# Patient Record
Sex: FEMALE | Race: WHITE | Employment: OTHER | URBAN - NONMETROPOLITAN AREA
[De-identification: names, ages, dates, MRNs, and addresses within clinical notes are randomized per-mention and may not be internally consistent; named-entity substitution may affect disease eponyms.]

---

## 2018-08-14 ENCOUNTER — OFFICE VISIT (OUTPATIENT)
Dept: CARDIOLOGY CLINIC | Age: 73
End: 2018-08-14
Payer: MEDICARE

## 2018-08-14 VITALS
BODY MASS INDEX: 31.21 KG/M2 | HEART RATE: 80 BPM | HEIGHT: 70 IN | SYSTOLIC BLOOD PRESSURE: 114 MMHG | WEIGHT: 218 LBS | DIASTOLIC BLOOD PRESSURE: 70 MMHG

## 2018-08-14 DIAGNOSIS — R07.9 CHEST PAIN, UNSPECIFIED TYPE: ICD-10-CM

## 2018-08-14 DIAGNOSIS — R06.00 DYSPNEA, UNSPECIFIED TYPE: Primary | ICD-10-CM

## 2018-08-14 PROCEDURE — 99203 OFFICE O/P NEW LOW 30 MIN: CPT | Performed by: INTERNAL MEDICINE

## 2018-08-14 RX ORDER — LANOLIN ALCOHOL/MO/W.PET/CERES
3 CREAM (GRAM) TOPICAL NIGHTLY PRN
COMMUNITY

## 2018-08-14 RX ORDER — CLINDAMYCIN HYDROCHLORIDE 300 MG/1
300 CAPSULE ORAL DAILY
COMMUNITY
End: 2022-01-01 | Stop reason: ALTCHOICE

## 2018-08-14 NOTE — PROGRESS NOTES
Today's Date: 8/14/2018  Patient Name: Lindsay Stiles  Patient's age: 74 y. o., 1945          The patient is a 74 y.o.  female is in the office for evaluation of abnormal ECG. She was admitted in May for possible hypoventilation and told ED she dad progressive MEYER and occasional chest tightness. She had chronic B LE edema for years. Denies prior cardiac hx. She had a possitive sleep study recently. Past Medical History:   has a past medical history of Cancer ([facility] Diabetes mellitus ([facility] GERD (gastroesophageal reflux disease); Hyperlipidemia; and Hypertension. Past Surgical History:   has a past surgical history that includes Cholecystectomy; Appendectomy; and Colon surgery. Home Medications:    Prior to Admission medications    Not on File       Allergies:  Patient has no known allergies. Social History:        REVIEW OF SYSTEMS:  CONSTITUTIONAL:NEGATIVE  HEENT:NEG  Cardiovascular: Yes chest pain, Yes dyspnea on exertion, No palpitations. Lower extremity edema: No  RESPIRATORY: MEYER  GASTROINTESTINAL:  negative  GENITOURINARY:  negative  INTEGUMENT:  negative  MUSCULOSKELETAL:  positive for  pain  NEUROLOGICAL:  negative    PHYSICAL EXAM:      /70 (Site: Left Arm, Position: Sitting, Cuff Size: Large Adult)   Pulse 80   Ht 5' 10\" (1.778 m)   Wt 218 lb (98.9 kg)   BMI 31.28 kg/m²    HEENT: PERRL, no cervical lymphadenopathy. No masses palpable. Cardiovascular:  · The apical impulse is not displaced  · Heart  Sounds:RRR, S4  · Jugular venous pulsation Normal  · The carotid upstroke is NL  · Peripheral pulses are symmetrical and full  Respiratory: Good respiratory effort. On auscultation: clear to auscultation bilaterally  Abdomen:  · No masses or tenderness  · Bowel sounds present  Extremities:  ·  No Cyanosis or Clubbing  ·  Lower extremity edema: Yes  Skin: Warm and dry    Cardiac data:    EKG: SR, NS T ABN.     Labs:     CBC: No results for input(s): WBC,

## 2018-08-14 NOTE — PATIENT INSTRUCTIONS
Nuclear Stress Test      Please report to [facility]. Use [facility]Blast Entrance. Please allow four hours to complete this test.  There will be frequent waiting periods, so feel free to bring something to do in the down times. You will have an IV started for this test.     --PLEASE GIVE 24 HOURS NOTICE TO CANCEL/RESCHEDULE--     >>  Nothing to eat or drink for FOUR (4) HOURS prior to arrival time. >>  No caffeine for 24 hours prior to test. This includes decaffeinated beverages,  chocolate, tea and cola drinks. >>  If you are diabetic, check with your Primary Care Provider regarding changes in your insulin or diabetic pills on test day.    >>  No smoking for 12 hours before the test.     >>  Please bring your prescription medications with you on Stress Day. ~  Take regular medications with sips of water.       ~  Hold the following medications prior to day of stress test:     _______________________________________________       ~If you wear a Nitroglycerin patch, please hold for 48 hours. >>  The nuclear camera comes close to your body during pictures; however, you are not enclosed. >>  If you are walking on the treadmill, wear comfortable clothes and shoes. Please do not apply lotion or oils to skin on the day of stress test.     +++ Pregnancy +++   If you are a woman of child bearing age, you will need to complete a blood test prior to your stress test. Please notify the nurse if you think you might be pregnant.

## 2018-08-21 ENCOUNTER — TELEPHONE (OUTPATIENT)
Dept: CARDIOLOGY | Age: 73
End: 2018-08-21

## 2018-08-21 DIAGNOSIS — R07.9 CHEST PAIN, UNSPECIFIED TYPE: ICD-10-CM

## 2018-08-21 DIAGNOSIS — R06.00 DYSPNEA, UNSPECIFIED TYPE: ICD-10-CM

## 2018-08-21 LAB
LEFT VENTRICULAR EJECTION FRACTION HIGH VALUE: NORMAL %
LEFT VENTRICULAR EJECTION FRACTION MODE: NORMAL
LV EF: NORMAL %

## 2018-09-04 ENCOUNTER — TELEPHONE (OUTPATIENT)
Dept: CARDIOLOGY CLINIC | Age: 73
End: 2018-09-04

## 2018-09-04 NOTE — LETTER
[address] [place] [address] Drive  [address] [place] [address]  Phone: [phone]    Courtney Mitchell MD        September 11, 2018    [address]      Dear Miah Ferguson: We have been unable to reach you by phone. We have your echocardiogam results. Please call us at [phone] to schedule a follow up appointment with cardiology. If you have any questions or concerns, please don't hesitate to call.     Sincerely,        Courtney Mitchell MD/ blessing

## 2021-01-01 ENCOUNTER — OUTSIDE SERVICES (OUTPATIENT)
Dept: INTERNAL MEDICINE | Age: 76
End: 2021-01-01
Payer: MEDICARE

## 2021-01-01 DIAGNOSIS — L03.116 CELLULITIS OF LEFT LEG: ICD-10-CM

## 2021-01-01 DIAGNOSIS — I10 HYPERTENSION, UNSPECIFIED TYPE: ICD-10-CM

## 2021-01-01 DIAGNOSIS — G47.33 OSA (OBSTRUCTIVE SLEEP APNEA): ICD-10-CM

## 2021-01-01 DIAGNOSIS — E11.9 TYPE 2 DIABETES MELLITUS WITHOUT COMPLICATION, UNSPECIFIED WHETHER LONG TERM INSULIN USE (HCC): Primary | ICD-10-CM

## 2021-01-01 DIAGNOSIS — N39.0 URINARY TRACT INFECTION WITHOUT HEMATURIA, SITE UNSPECIFIED: ICD-10-CM

## 2021-01-01 PROCEDURE — 99305 1ST NF CARE MODERATE MDM 35: CPT | Performed by: INTERNAL MEDICINE

## 2021-12-31 PROBLEM — E11.9 TYPE 2 DIABETES MELLITUS WITHOUT COMPLICATION (HCC): Status: ACTIVE | Noted: 2021-01-01

## 2021-12-31 PROBLEM — G47.33 OSA (OBSTRUCTIVE SLEEP APNEA): Status: ACTIVE | Noted: 2021-01-01

## 2021-12-31 PROBLEM — I10 HYPERTENSION: Status: ACTIVE | Noted: 2021-01-01

## 2021-12-31 NOTE — PROGRESS NOTES
Date of Admission: 12/17/2021  Date of Encounter: 12/22/2021  Patient:  Jamey Stockton  YOB: 1945      Chief Complaint: Admission after treatment in the hospital for UTI and cellulitis    History of Present Illness:  Patient is a 17-year-old with a past medical history of hypertension, diabetes, AMMON, was admitted to [facility] after treatment in the hospital for UTI as well as cellulitis in the left lower extremity. She seems to be doing better. She improved after antibiotics. Her is less painful now, denies any fever or chills. She denies also having any dysuria, hematuria, abdominal pain, flank pain at this time. Past Medical History: Hypertension, AMMON, type II diabetes  Past Family History: Hypertension  Social History: Denies smoking at this time    Review of Systems:  Constitutional: Denies fever, chills  Cardiac: Denies chest pain, palpitations  Except as dictated above, all other systems reviewed and are negative     Physical Exam  Vitals: Blood pressure 121/66. Pulse 64. Respirations 18. Temperature 98.5  Constitutional: No acute distress  Eyes: No lid lag or proptosis. PERRLA  HENT: External ears normal. No lesions on the lips  Respiratory: Good respiratory effort. No wheezing or crackles  Cardiovascular: Normal S1-S2. No lower extremity edema  Gastrointestinal: No visible veins or masses. Good bowel sounds  Skin: No abnormal rashes. No digital cyanosis  Neurologic: Cranial nerves grossly intact. Sensation grossly intact  Psychiatric: Alert and oriented. Normal Affect. Assessments  UTI  Cellulitis of the left lower extremity  Hypertension  AMMON  Type 2 diabetes    Plan  We will continue current medications. I will be notified of any changes to the patient's condition.   Cellulitis appears to be better, will continue antibiotics  We will continue to monitor blood pressure and blood sugar and adjust medications as necessary

## 2022-01-01 ENCOUNTER — HOSPITAL ENCOUNTER (INPATIENT)
Age: 77
LOS: 6 days | Discharge: OTHER INSTITUTION | DRG: 603 | End: 2022-07-14
Attending: EMERGENCY MEDICINE | Admitting: INTERNAL MEDICINE
Payer: MEDICARE

## 2022-01-01 ENCOUNTER — APPOINTMENT (OUTPATIENT)
Dept: CT IMAGING | Age: 77
DRG: 603 | End: 2022-01-01
Payer: MEDICARE

## 2022-01-01 ENCOUNTER — HOSPITAL ENCOUNTER (INPATIENT)
Age: 77
LOS: 1 days | DRG: 871 | End: 2022-09-30
Attending: EMERGENCY MEDICINE | Admitting: INTERNAL MEDICINE
Payer: MEDICARE

## 2022-01-01 ENCOUNTER — NURSE TRIAGE (OUTPATIENT)
Dept: OTHER | Facility: CLINIC | Age: 77
End: 2022-01-01

## 2022-01-01 ENCOUNTER — APPOINTMENT (OUTPATIENT)
Dept: GENERAL RADIOLOGY | Age: 77
DRG: 871 | End: 2022-01-01
Payer: MEDICARE

## 2022-01-01 ENCOUNTER — TELEPHONE (OUTPATIENT)
Dept: FAMILY MEDICINE CLINIC | Age: 77
End: 2022-01-01

## 2022-01-01 ENCOUNTER — APPOINTMENT (OUTPATIENT)
Dept: ULTRASOUND IMAGING | Age: 77
DRG: 871 | End: 2022-01-01
Payer: MEDICARE

## 2022-01-01 ENCOUNTER — OFFICE VISIT (OUTPATIENT)
Dept: FAMILY MEDICINE CLINIC | Age: 77
DRG: 603 | End: 2022-01-01
Payer: MEDICARE

## 2022-01-01 ENCOUNTER — APPOINTMENT (OUTPATIENT)
Dept: INTERVENTIONAL RADIOLOGY/VASCULAR | Age: 77
DRG: 603 | End: 2022-01-01
Payer: MEDICARE

## 2022-01-01 VITALS
SYSTOLIC BLOOD PRESSURE: 138 MMHG | DIASTOLIC BLOOD PRESSURE: 60 MMHG | OXYGEN SATURATION: 96 % | WEIGHT: 195 LBS | TEMPERATURE: 97.1 F | HEIGHT: 63 IN | BODY MASS INDEX: 34.55 KG/M2 | HEART RATE: 82 BPM | RESPIRATION RATE: 16 BRPM

## 2022-01-01 VITALS
HEART RATE: 58 BPM | OXYGEN SATURATION: 99 % | RESPIRATION RATE: 17 BRPM | BODY MASS INDEX: 27.93 KG/M2 | DIASTOLIC BLOOD PRESSURE: 60 MMHG | WEIGHT: 195.1 LBS | HEIGHT: 70 IN | TEMPERATURE: 96.6 F | SYSTOLIC BLOOD PRESSURE: 125 MMHG

## 2022-01-01 VITALS
RESPIRATION RATE: 30 BRPM | DIASTOLIC BLOOD PRESSURE: 13 MMHG | WEIGHT: 172.4 LBS | BODY MASS INDEX: 24.68 KG/M2 | HEIGHT: 70 IN | TEMPERATURE: 96.3 F | OXYGEN SATURATION: 94 % | SYSTOLIC BLOOD PRESSURE: 80 MMHG

## 2022-01-01 DIAGNOSIS — N63.20 LEFT BREAST MASS: ICD-10-CM

## 2022-01-01 DIAGNOSIS — R60.0 LOCALIZED EDEMA: ICD-10-CM

## 2022-01-01 DIAGNOSIS — L03.119 CELLULITIS OF LOWER EXTREMITY, UNSPECIFIED LATERALITY: Primary | ICD-10-CM

## 2022-01-01 DIAGNOSIS — L89.90 PRESSURE INJURY OF SKIN, UNSPECIFIED INJURY STAGE, UNSPECIFIED LOCATION: ICD-10-CM

## 2022-01-01 DIAGNOSIS — T68.XXXA HYPOTHERMIA, INITIAL ENCOUNTER: ICD-10-CM

## 2022-01-01 DIAGNOSIS — I63.9 CEREBROVASCULAR ACCIDENT (CVA), UNSPECIFIED MECHANISM (HCC): ICD-10-CM

## 2022-01-01 DIAGNOSIS — N63.20 BREAST MASS, LEFT: Primary | ICD-10-CM

## 2022-01-01 DIAGNOSIS — R30.0 DYSURIA: ICD-10-CM

## 2022-01-01 DIAGNOSIS — E11.9 TYPE 2 DIABETES MELLITUS WITHOUT COMPLICATION, UNSPECIFIED WHETHER LONG TERM INSULIN USE (HCC): Primary | ICD-10-CM

## 2022-01-01 DIAGNOSIS — A41.9 SEPTICEMIA (HCC): Primary | ICD-10-CM

## 2022-01-01 DIAGNOSIS — R60.0 BILATERAL LEG EDEMA: ICD-10-CM

## 2022-01-01 DIAGNOSIS — N63.20 BREAST MASS, LEFT: ICD-10-CM

## 2022-01-01 LAB
-: ABNORMAL
ABSOLUTE EOS #: 0 K/UL (ref 0–0.4)
ABSOLUTE EOS #: 0 K/UL (ref 0–0.4)
ABSOLUTE EOS #: 0.58 K/UL (ref 0–0.44)
ABSOLUTE EOS #: 0.58 K/UL (ref 0–0.44)
ABSOLUTE EOS #: 0.62 K/UL (ref 0–0.44)
ABSOLUTE EOS #: 0.63 K/UL (ref 0–0.44)
ABSOLUTE EOS #: 0.66 K/UL (ref 0–0.44)
ABSOLUTE EOS #: 0.7 K/UL (ref 0–0.44)
ABSOLUTE EOS #: 0.7 K/UL (ref 0–0.44)
ABSOLUTE IMMATURE GRANULOCYTE: 0.03 K/UL (ref 0–0.3)
ABSOLUTE IMMATURE GRANULOCYTE: 0.13 K/UL (ref 0–0.3)
ABSOLUTE IMMATURE GRANULOCYTE: 0.55 K/UL (ref 0–0.3)
ABSOLUTE IMMATURE GRANULOCYTE: <0.03 K/UL (ref 0–0.3)
ABSOLUTE LYMPH #: 1.01 K/UL (ref 1–4.8)
ABSOLUTE LYMPH #: 1.92 K/UL (ref 1–4.8)
ABSOLUTE LYMPH #: 2.73 K/UL (ref 1.1–3.7)
ABSOLUTE LYMPH #: 2.74 K/UL (ref 1.1–3.7)
ABSOLUTE LYMPH #: 2.75 K/UL (ref 1.1–3.7)
ABSOLUTE LYMPH #: 2.85 K/UL (ref 1.1–3.7)
ABSOLUTE LYMPH #: 2.93 K/UL (ref 1.1–3.7)
ABSOLUTE LYMPH #: 3.01 K/UL (ref 1.1–3.7)
ABSOLUTE LYMPH #: 3.24 K/UL (ref 1.1–3.7)
ABSOLUTE MONO #: 0.27 K/UL (ref 0.1–0.8)
ABSOLUTE MONO #: 0.5 K/UL (ref 0.1–0.8)
ABSOLUTE MONO #: 0.62 K/UL (ref 0.1–1.2)
ABSOLUTE MONO #: 0.65 K/UL (ref 0.1–1.2)
ABSOLUTE MONO #: 0.68 K/UL (ref 0.1–1.2)
ABSOLUTE MONO #: 0.69 K/UL (ref 0.1–1.2)
ABSOLUTE MONO #: 0.7 K/UL (ref 0.1–1.2)
ABSOLUTE MONO #: 0.71 K/UL (ref 0.1–1.2)
ABSOLUTE MONO #: 0.78 K/UL (ref 0.1–1.2)
ACTION: NORMAL
ACTION: NORMAL
ALBUMIN SERPL-MCNC: 1.9 G/DL (ref 3.5–5.2)
ALBUMIN SERPL-MCNC: 4 G/DL (ref 3.5–5.2)
ALBUMIN/GLOBULIN RATIO: 0.6 (ref 1–2.5)
ALBUMIN/GLOBULIN RATIO: 1.2 (ref 1–2.5)
ALLEN TEST: ABNORMAL
ALP BLD-CCNC: 97 U/L (ref 35–104)
ALP BLD-CCNC: 99 U/L (ref 35–104)
ALT SERPL-CCNC: 15 U/L (ref 5–33)
ALT SERPL-CCNC: 69 U/L (ref 5–33)
ANION GAP SERPL CALCULATED.3IONS-SCNC: 10 MMOL/L (ref 9–17)
ANION GAP SERPL CALCULATED.3IONS-SCNC: 12 MMOL/L (ref 9–17)
ANION GAP SERPL CALCULATED.3IONS-SCNC: 14 MMOL/L (ref 9–17)
ANION GAP SERPL CALCULATED.3IONS-SCNC: 15 MMOL/L (ref 9–17)
ANION GAP SERPL CALCULATED.3IONS-SCNC: 16 MMOL/L (ref 9–17)
ANION GAP SERPL CALCULATED.3IONS-SCNC: 25 MMOL/L (ref 9–17)
ANION GAP SERPL CALCULATED.3IONS-SCNC: 7 MMOL/L (ref 9–17)
ANION GAP SERPL CALCULATED.3IONS-SCNC: 8 MMOL/L (ref 9–17)
ANION GAP SERPL CALCULATED.3IONS-SCNC: 9 MMOL/L (ref 9–17)
ANION GAP SERPL CALCULATED.3IONS-SCNC: 9 MMOL/L (ref 9–17)
ANION GAP SERPL CALCULATED.3IONS-SCNC: ABNORMAL MMOL/L (ref 9–17)
AST SERPL-CCNC: 16 U/L
AST SERPL-CCNC: 48 U/L
BACTERIA: ABNORMAL
BACTERIA: ABNORMAL
BASOPHILS # BLD: 0 % (ref 0–2)
BASOPHILS # BLD: 0 % (ref 0–2)
BASOPHILS # BLD: 1 % (ref 0–2)
BASOPHILS ABSOLUTE: 0 K/UL (ref 0–0.2)
BASOPHILS ABSOLUTE: 0 K/UL (ref 0–0.2)
BASOPHILS ABSOLUTE: 0.05 K/UL (ref 0–0.2)
BASOPHILS ABSOLUTE: 0.06 K/UL (ref 0–0.2)
BASOPHILS ABSOLUTE: 0.06 K/UL (ref 0–0.2)
BASOPHILS ABSOLUTE: 0.07 K/UL (ref 0–0.2)
BASOPHILS ABSOLUTE: 0.07 K/UL (ref 0–0.2)
BASOPHILS ABSOLUTE: 0.08 K/UL (ref 0–0.2)
BASOPHILS ABSOLUTE: 0.09 K/UL (ref 0–0.2)
BILIRUB SERPL-MCNC: 0.33 MG/DL (ref 0.3–1.2)
BILIRUB SERPL-MCNC: 1.2 MG/DL (ref 0.3–1.2)
BILIRUBIN URINE: ABNORMAL
BILIRUBIN URINE: NEGATIVE
BUN BLDV-MCNC: 17 MG/DL (ref 8–23)
BUN BLDV-MCNC: 18 MG/DL (ref 8–23)
BUN BLDV-MCNC: 20 MG/DL (ref 8–23)
BUN BLDV-MCNC: 22 MG/DL (ref 8–23)
BUN BLDV-MCNC: 26 MG/DL (ref 8–23)
BUN BLDV-MCNC: 26 MG/DL (ref 8–23)
BUN BLDV-MCNC: 32 MG/DL (ref 8–23)
BUN BLDV-MCNC: 61 MG/DL (ref 8–23)
BUN BLDV-MCNC: 75 MG/DL (ref 8–23)
BUN BLDV-MCNC: 83 MG/DL (ref 8–23)
BUN BLDV-MCNC: 85 MG/DL (ref 8–23)
BUN BLDV-MCNC: 85 MG/DL (ref 8–23)
BUN BLDV-MCNC: 93 MG/DL (ref 8–23)
BUN/CREAT BLD: 18 (ref 9–20)
BUN/CREAT BLD: 18 (ref 9–20)
BUN/CREAT BLD: 21 (ref 9–20)
BUN/CREAT BLD: 21 (ref 9–20)
BUN/CREAT BLD: 23 (ref 9–20)
BUN/CREAT BLD: 29 (ref 9–20)
BUN/CREAT BLD: 30 (ref 9–20)
C-REACTIVE PROTEIN: 346.2 MG/L (ref 0–5)
CALCIUM IONIZED: 1.48 MMOL/L (ref 1.13–1.33)
CALCIUM SERPL-MCNC: 10.1 MG/DL (ref 8.6–10.4)
CALCIUM SERPL-MCNC: 10.3 MG/DL (ref 8.6–10.4)
CALCIUM SERPL-MCNC: 10.4 MG/DL (ref 8.6–10.4)
CALCIUM SERPL-MCNC: 10.9 MG/DL (ref 8.6–10.4)
CALCIUM SERPL-MCNC: 11.4 MG/DL (ref 8.6–10.4)
CALCIUM SERPL-MCNC: 5.9 MG/DL (ref 8.6–10.4)
CALCIUM SERPL-MCNC: 7.9 MG/DL (ref 8.6–10.4)
CALCIUM SERPL-MCNC: 9.1 MG/DL (ref 8.6–10.4)
CALCIUM SERPL-MCNC: 9.3 MG/DL (ref 8.6–10.4)
CALCIUM SERPL-MCNC: 9.7 MG/DL (ref 8.6–10.4)
CALCIUM SERPL-MCNC: 9.7 MG/DL (ref 8.6–10.4)
CARBOXYHEMOGLOBIN: 1.4 % (ref 0–5)
CASTS UA: ABNORMAL /LPF (ref 0–2)
CASTS UA: ABNORMAL /LPF (ref 0–2)
CHLORIDE BLD-SCNC: 103 MMOL/L (ref 98–107)
CHLORIDE BLD-SCNC: 103 MMOL/L (ref 98–107)
CHLORIDE BLD-SCNC: 105 MMOL/L (ref 98–107)
CHLORIDE BLD-SCNC: 105 MMOL/L (ref 98–107)
CHLORIDE BLD-SCNC: 106 MMOL/L (ref 98–107)
CHLORIDE BLD-SCNC: 107 MMOL/L (ref 98–107)
CHLORIDE BLD-SCNC: 107 MMOL/L (ref 98–107)
CHLORIDE BLD-SCNC: 122 MMOL/L (ref 98–107)
CHLORIDE BLD-SCNC: 123 MMOL/L (ref 98–107)
CHLORIDE BLD-SCNC: 123 MMOL/L (ref 98–107)
CHLORIDE BLD-SCNC: 124 MMOL/L (ref 98–107)
CHLORIDE BLD-SCNC: 124 MMOL/L (ref 98–107)
CHLORIDE BLD-SCNC: 131 MMOL/L (ref 98–107)
CHOLESTEROL/HDL RATIO: 4.6
CHOLESTEROL: 187 MG/DL
CHP ED QC CHECK: ABNORMAL
CO2: 11 MMOL/L (ref 20–31)
CO2: 12 MMOL/L (ref 20–31)
CO2: 16 MMOL/L (ref 20–31)
CO2: 18 MMOL/L (ref 20–31)
CO2: 21 MMOL/L (ref 20–31)
CO2: 23 MMOL/L (ref 20–31)
CO2: 23 MMOL/L (ref 20–31)
CO2: 24 MMOL/L (ref 20–31)
CO2: 25 MMOL/L (ref 20–31)
CO2: 26 MMOL/L (ref 20–31)
CO2: 27 MMOL/L (ref 20–31)
CO2: 29 MMOL/L (ref 20–31)
CO2: <6 MMOL/L (ref 20–31)
COLOR: ABNORMAL
COMPLEMENT C3: 64 MG/DL (ref 90–180)
COMPLEMENT C4: 13 MG/DL (ref 10–40)
CREAT SERPL-MCNC: 0.9 MG/DL (ref 0.5–0.9)
CREAT SERPL-MCNC: 0.94 MG/DL (ref 0.5–0.9)
CREAT SERPL-MCNC: 0.95 MG/DL (ref 0.5–0.9)
CREAT SERPL-MCNC: 0.95 MG/DL (ref 0.5–0.9)
CREAT SERPL-MCNC: 1.02 MG/DL (ref 0.5–0.9)
CREAT SERPL-MCNC: 1.04 MG/DL (ref 0.5–0.9)
CREAT SERPL-MCNC: 1.08 MG/DL (ref 0.5–0.9)
CREAT SERPL-MCNC: 1.14 MG/DL (ref 0.5–0.9)
CREAT SERPL-MCNC: 1.41 MG/DL (ref 0.5–0.9)
CREAT SERPL-MCNC: 1.83 MG/DL (ref 0.5–0.9)
CREAT SERPL-MCNC: 1.91 MG/DL (ref 0.5–0.9)
CREAT SERPL-MCNC: 2.15 MG/DL (ref 0.5–0.9)
CREAT SERPL-MCNC: 2.19 MG/DL (ref 0.5–0.9)
CREATININE URINE: 74.4 MG/DL (ref 28–217)
CRYSTALS, UA: ABNORMAL /HPF
CRYSTALS, UA: ABNORMAL /HPF
CULTURE: NO GROWTH
CULTURE: NORMAL
CULTURE: NORMAL
DATE AND TIME: NORMAL
DATE AND TIME: NORMAL
EKG ATRIAL RATE: 357 BPM
EKG ATRIAL RATE: 76 BPM
EKG P AXIS: 51 DEGREES
EKG P-R INTERVAL: 178 MS
EKG Q-T INTERVAL: 378 MS
EKG Q-T INTERVAL: 450 MS
EKG QRS DURATION: 98 MS
EKG QRS DURATION: 98 MS
EKG QTC CALCULATION (BAZETT): 425 MS
EKG QTC CALCULATION (BAZETT): 499 MS
EKG R AXIS: -45 DEGREES
EKG R AXIS: -49 DEGREES
EKG T AXIS: 123 DEGREES
EKG T AXIS: 51 DEGREES
EKG VENTRICULAR RATE: 74 BPM
EKG VENTRICULAR RATE: 76 BPM
EOSINOPHIL,URINE: NORMAL
EOSINOPHILS RELATIVE PERCENT: 0 % (ref 1–4)
EOSINOPHILS RELATIVE PERCENT: 0 % (ref 1–4)
EOSINOPHILS RELATIVE PERCENT: 7 % (ref 1–4)
EOSINOPHILS RELATIVE PERCENT: 7 % (ref 1–4)
EOSINOPHILS RELATIVE PERCENT: 8 % (ref 1–4)
EOSINOPHILS RELATIVE PERCENT: 9 % (ref 1–4)
EPITHELIAL CELLS UA: ABNORMAL /HPF (ref 0–5)
EPITHELIAL CELLS UA: ABNORMAL /HPF (ref 0–5)
ESTIMATED AVERAGE GLUCOSE: 128 MG/DL
FIO2: 100
FIO2: ABNORMAL
FREE KAPPA/LAMBDA RATIO: 3.01 (ref 0.26–1.65)
GFR AFRICAN AMERICAN: 26 ML/MIN
GFR AFRICAN AMERICAN: 27 ML/MIN
GFR AFRICAN AMERICAN: 31 ML/MIN
GFR AFRICAN AMERICAN: 32 ML/MIN
GFR AFRICAN AMERICAN: 44 ML/MIN
GFR AFRICAN AMERICAN: 56 ML/MIN
GFR AFRICAN AMERICAN: 59 ML/MIN
GFR AFRICAN AMERICAN: >60 ML/MIN
GFR NON-AFRICAN AMERICAN: 22 ML/MIN
GFR NON-AFRICAN AMERICAN: 22 ML/MIN
GFR NON-AFRICAN AMERICAN: 25 ML/MIN
GFR NON-AFRICAN AMERICAN: 27 ML/MIN
GFR NON-AFRICAN AMERICAN: 36 ML/MIN
GFR NON-AFRICAN AMERICAN: 46 ML/MIN
GFR NON-AFRICAN AMERICAN: 49 ML/MIN
GFR NON-AFRICAN AMERICAN: 51 ML/MIN
GFR NON-AFRICAN AMERICAN: 52 ML/MIN
GFR NON-AFRICAN AMERICAN: 57 ML/MIN
GFR NON-AFRICAN AMERICAN: >60 ML/MIN
GFR SERPL CREATININE-BSD FRML MDRD: ABNORMAL ML/MIN/{1.73_M2}
GLUCOSE BLD-MCNC: 100 MG/DL (ref 65–105)
GLUCOSE BLD-MCNC: 100 MG/DL (ref 74–100)
GLUCOSE BLD-MCNC: 103 MG/DL (ref 70–99)
GLUCOSE BLD-MCNC: 103 MG/DL (ref 70–99)
GLUCOSE BLD-MCNC: 104 MG/DL (ref 70–99)
GLUCOSE BLD-MCNC: 108 MG/DL (ref 70–99)
GLUCOSE BLD-MCNC: 111 MG/DL (ref 70–99)
GLUCOSE BLD-MCNC: 113 MG/DL (ref 70–99)
GLUCOSE BLD-MCNC: 114 MG/DL (ref 70–99)
GLUCOSE BLD-MCNC: 128 MG/DL (ref 70–99)
GLUCOSE BLD-MCNC: 145 MG/DL
GLUCOSE BLD-MCNC: 189 MG/DL (ref 65–105)
GLUCOSE BLD-MCNC: 62 MG/DL (ref 74–100)
GLUCOSE BLD-MCNC: 64 MG/DL (ref 65–105)
GLUCOSE BLD-MCNC: 69 MG/DL (ref 65–105)
GLUCOSE BLD-MCNC: 74 MG/DL (ref 65–105)
GLUCOSE BLD-MCNC: 85 MG/DL (ref 65–105)
GLUCOSE BLD-MCNC: 87 MG/DL (ref 70–99)
GLUCOSE BLD-MCNC: 87 MG/DL (ref 70–99)
GLUCOSE BLD-MCNC: 88 MG/DL (ref 65–105)
GLUCOSE BLD-MCNC: 89 MG/DL (ref 70–99)
GLUCOSE BLD-MCNC: 95 MG/DL (ref 70–99)
GLUCOSE BLD-MCNC: 96 MG/DL (ref 70–99)
GLUCOSE BLD-MCNC: 98 MG/DL (ref 74–100)
GLUCOSE URINE: NEGATIVE
GLUCOSE URINE: NEGATIVE
HBA1C MFR BLD: 6.1 % (ref 4–6)
HCO3 VENOUS: 24 MMOL/L (ref 24–30)
HCT VFR BLD CALC: 41 % (ref 36.3–47.1)
HCT VFR BLD CALC: 42.7 % (ref 36.3–47.1)
HCT VFR BLD CALC: 44.1 % (ref 36.3–47.1)
HCT VFR BLD CALC: 44.8 % (ref 36.3–47.1)
HCT VFR BLD CALC: 45 % (ref 36.3–47.1)
HCT VFR BLD CALC: 45.4 % (ref 36.3–47.1)
HCT VFR BLD CALC: 46.4 % (ref 36.3–47.1)
HCT VFR BLD CALC: 49.2 % (ref 36.3–47.1)
HCT VFR BLD CALC: 57 % (ref 36.3–47.1)
HDLC SERPL-MCNC: 41 MG/DL
HEMOGLOBIN: 13.8 G/DL (ref 11.9–15.1)
HEMOGLOBIN: 14.4 G/DL (ref 11.9–15.1)
HEMOGLOBIN: 14.5 G/DL (ref 11.9–15.1)
HEMOGLOBIN: 14.8 G/DL (ref 11.9–15.1)
HEMOGLOBIN: 14.9 G/DL (ref 11.9–15.1)
HEMOGLOBIN: 15 G/DL (ref 11.9–15.1)
HEMOGLOBIN: 15.7 G/DL (ref 11.9–15.1)
HEMOGLOBIN: 17.2 G/DL (ref 11.9–15.1)
HEMOGLOBIN: 18.2 G/DL (ref 11.9–15.1)
IMMATURE GRANULOCYTES: 0 %
IMMATURE GRANULOCYTES: 1 %
IMMATURE GRANULOCYTES: 4 %
KAPPA FREE LIGHT CHAINS QNT: 4.58 MG/DL (ref 0.37–1.94)
KETONES, URINE: NEGATIVE
KETONES, URINE: NEGATIVE
LACTIC ACID, SEPSIS WHOLE BLOOD: 2.6 MMOL/L (ref 0.5–1.9)
LACTIC ACID, SEPSIS WHOLE BLOOD: 3.1 MMOL/L (ref 0.5–1.9)
LACTIC ACID, SEPSIS: 0.8 MMOL/L (ref 0.5–1.9)
LACTIC ACID, WHOLE BLOOD: 16 MMOL/L (ref 0.7–2.1)
LAMBDA FREE LIGHT CHAINS QNT: 1.52 MG/DL (ref 0.57–2.63)
LDL CHOLESTEROL: 114 MG/DL (ref 0–130)
LEUKOCYTE ESTERASE, URINE: ABNORMAL
LEUKOCYTE ESTERASE, URINE: ABNORMAL
LYMPHOCYTES # BLD: 14 % (ref 24–44)
LYMPHOCYTES # BLD: 32 % (ref 24–43)
LYMPHOCYTES # BLD: 34 % (ref 24–43)
LYMPHOCYTES # BLD: 34 % (ref 24–43)
LYMPHOCYTES # BLD: 35 % (ref 24–43)
LYMPHOCYTES # BLD: 36 % (ref 24–43)
LYMPHOCYTES # BLD: 37 % (ref 24–43)
LYMPHOCYTES # BLD: 37 % (ref 24–43)
LYMPHOCYTES # BLD: 8 % (ref 24–44)
MAGNESIUM: 2.4 MG/DL (ref 1.6–2.6)
MAGNESIUM: 2.5 MG/DL (ref 1.6–2.6)
MAGNESIUM: 2.7 MG/DL (ref 1.6–2.6)
MCH RBC QN AUTO: 31.5 PG (ref 25.2–33.5)
MCH RBC QN AUTO: 31.9 PG (ref 25.2–33.5)
MCH RBC QN AUTO: 31.9 PG (ref 25.2–33.5)
MCH RBC QN AUTO: 32 PG (ref 25.2–33.5)
MCH RBC QN AUTO: 32.3 PG (ref 25.2–33.5)
MCH RBC QN AUTO: 32.4 PG (ref 25.2–33.5)
MCH RBC QN AUTO: 32.5 PG (ref 25.2–33.5)
MCH RBC QN AUTO: 32.6 PG (ref 25.2–33.5)
MCH RBC QN AUTO: 32.7 PG (ref 25.2–33.5)
MCHC RBC AUTO-ENTMCNC: 31.7 G/DL (ref 25.2–33.5)
MCHC RBC AUTO-ENTMCNC: 31.9 G/DL (ref 28.4–34.8)
MCHC RBC AUTO-ENTMCNC: 33.3 G/DL (ref 25.2–33.5)
MCHC RBC AUTO-ENTMCNC: 33.3 G/DL (ref 25.2–33.5)
MCHC RBC AUTO-ENTMCNC: 33.6 G/DL (ref 25.2–33.5)
MCHC RBC AUTO-ENTMCNC: 33.7 G/DL (ref 25.2–33.5)
MCHC RBC AUTO-ENTMCNC: 33.8 G/DL (ref 25.2–33.5)
MCHC RBC AUTO-ENTMCNC: 34 G/DL (ref 25.2–33.5)
MCHC RBC AUTO-ENTMCNC: 35 G/DL (ref 28.4–34.8)
MCV RBC AUTO: 102.9 FL (ref 82.6–102.9)
MCV RBC AUTO: 93.4 FL (ref 82.6–102.9)
MCV RBC AUTO: 94.7 FL (ref 82.6–102.9)
MCV RBC AUTO: 95.5 FL (ref 82.6–102.9)
MCV RBC AUTO: 95.7 FL (ref 82.6–102.9)
MCV RBC AUTO: 95.9 FL (ref 82.6–102.9)
MCV RBC AUTO: 95.9 FL (ref 82.6–102.9)
MCV RBC AUTO: 96.8 FL (ref 82.6–102.9)
MCV RBC AUTO: 98.8 FL (ref 82.6–102.9)
MODE: ABNORMAL
MODE: ABNORMAL
MONOCYTES # BLD: 2 % (ref 1–7)
MONOCYTES # BLD: 4 % (ref 1–7)
MONOCYTES # BLD: 7 % (ref 3–12)
MONOCYTES # BLD: 8 % (ref 3–12)
MONOCYTES # BLD: 9 % (ref 3–12)
MORPHOLOGY: ABNORMAL
MORPHOLOGY: NORMAL
NEGATIVE BASE EXCESS, ART: 10 (ref 0–2)
NEGATIVE BASE EXCESS, ART: 14 (ref 0–2)
NEGATIVE BASE EXCESS, ART: 14 (ref 0–2)
NEGATIVE BASE EXCESS, ART: 22 (ref 0–2)
NEGATIVE BASE EXCESS, VEN: 7.2 MMOL/L (ref 0–2)
NITRITE, URINE: NEGATIVE
NITRITE, URINE: NEGATIVE
NOTIFY: NORMAL
NOTIFY: NORMAL
NRBC AUTOMATED: 0 PER 100 WBC
NRBC AUTOMATED: 0.6 PER 100 WBC
O2 DEVICE/FLOW/%: ABNORMAL
O2 SAT, VEN: 76.9 % (ref 60–85)
PARTIAL THROMBOPLASTIN TIME: 25.7 SEC (ref 23.9–33.8)
PATIENT TEMP: 34.2
PATIENT TEMP: 35.1
PATIENT TEMP: 37
PCO2, VEN: 71.5 MM HG (ref 39–55)
PDW BLD-RTO: 11.9 % (ref 11.8–14.4)
PDW BLD-RTO: 12 % (ref 11.8–14.4)
PDW BLD-RTO: 12.2 % (ref 11.8–14.4)
PDW BLD-RTO: 13.8 % (ref 11.8–14.4)
PDW BLD-RTO: 14.3 % (ref 11.8–14.4)
PH UA: 5 (ref 5–8)
PH UA: 7 (ref 5–6)
PH VENOUS: 7.15 (ref 7.32–7.42)
PHOSPHORUS: 11.2 MG/DL (ref 2.6–4.5)
PLATELET # BLD: 209 K/UL (ref 138–453)
PLATELET # BLD: 210 K/UL (ref 138–453)
PLATELET # BLD: 213 K/UL (ref 138–453)
PLATELET # BLD: 215 K/UL (ref 138–453)
PLATELET # BLD: 216 K/UL (ref 138–453)
PLATELET # BLD: 249 K/UL (ref 138–453)
PLATELET # BLD: ABNORMAL K/UL (ref 138–453)
PLATELET, FLUORESCENCE: 196 K/UL (ref 138–453)
PLATELET, FLUORESCENCE: 255 K/UL (ref 138–453)
PLATELET, IMMATURE FRACTION: 2 % (ref 1.1–10.3)
PLATELET, IMMATURE FRACTION: 6.1 % (ref 1.1–10.3)
PMV BLD AUTO: 11 FL (ref 8.1–13.5)
PMV BLD AUTO: 9.2 FL (ref 8.1–13.5)
PMV BLD AUTO: 9.3 FL (ref 8.1–13.5)
PMV BLD AUTO: 9.5 FL (ref 8.1–13.5)
PO2, VEN: 58.1 MM HG (ref 30–50)
POC HCO3: 11.1 MMOL/L (ref 21–28)
POC HCO3: 13.2 MMOL/L (ref 21–28)
POC HCO3: 17.8 MMOL/L (ref 21–28)
POC HCO3: 22.2 MMOL/L (ref 21–28)
POC LACTIC ACID: 5.33 MMOL/L (ref 0.56–1.39)
POC O2 SATURATION: 96 % (ref 94–98)
POC O2 SATURATION: 96 % (ref 94–98)
POC O2 SATURATION: 98 % (ref 94–98)
POC O2 SATURATION: 98 % (ref 94–98)
POC PCO2 TEMP: 61 MM HG
POC PCO2 TEMP: 67 MM HG
POC PCO2: 36.2 MM HG (ref 35–48)
POC PCO2: 56.4 MM HG (ref 35–48)
POC PCO2: 65.7 MM HG (ref 35–48)
POC PCO2: 76.1 MM HG (ref 35–48)
POC PH TEMP: 7.07
POC PH TEMP: 7.11
POC PH: 6.9 (ref 7.35–7.45)
POC PH: 7.04 (ref 7.35–7.45)
POC PH: 7.07 (ref 7.35–7.45)
POC PH: 7.17 (ref 7.35–7.45)
POC PO2 TEMP: 133 MM HG
POC PO2 TEMP: 98 MM HG
POC PO2: 103.2 MM HG (ref 83–108)
POC PO2: 114.3 MM HG (ref 83–108)
POC PO2: 143.7 MM HG (ref 83–108)
POC PO2: 165.2 MM HG (ref 83–108)
POTASSIUM SERPL-SCNC: 1.9 MMOL/L (ref 3.7–5.3)
POTASSIUM SERPL-SCNC: 3 MMOL/L (ref 3.7–5.3)
POTASSIUM SERPL-SCNC: 3.9 MMOL/L (ref 3.7–5.3)
POTASSIUM SERPL-SCNC: 3.9 MMOL/L (ref 3.7–5.3)
POTASSIUM SERPL-SCNC: 4.2 MMOL/L (ref 3.7–5.3)
POTASSIUM SERPL-SCNC: 4.3 MMOL/L (ref 3.7–5.3)
POTASSIUM SERPL-SCNC: 4.8 MMOL/L (ref 3.7–5.3)
POTASSIUM SERPL-SCNC: 5.7 MMOL/L (ref 3.7–5.3)
POTASSIUM SERPL-SCNC: 6.1 MMOL/L (ref 3.7–5.3)
POTASSIUM SERPL-SCNC: 6.2 MMOL/L (ref 3.7–5.3)
POTASSIUM SERPL-SCNC: 6.8 MMOL/L (ref 3.7–5.3)
PRO-BNP: 401 PG/ML
PROTEIN UA: ABNORMAL
PROTEIN UA: NEGATIVE
RBC # BLD: 4.33 M/UL (ref 3.95–5.11)
RBC # BLD: 4.41 M/UL (ref 3.95–5.11)
RBC # BLD: 4.46 M/UL (ref 3.95–5.11)
RBC # BLD: 4.62 M/UL (ref 3.95–5.11)
RBC # BLD: 4.65 M/UL (ref 3.95–5.11)
RBC # BLD: 4.67 M/UL (ref 3.95–5.11)
RBC # BLD: 4.84 M/UL (ref 3.95–5.11)
RBC # BLD: 5.27 M/UL (ref 3.95–5.11)
RBC # BLD: 5.77 M/UL (ref 3.95–5.11)
RBC UA: ABNORMAL /HPF (ref 0–2)
RBC UA: ABNORMAL /HPF (ref 0–4)
READ BACK: YES
READ BACK: YES
REASON FOR REJECTION: NORMAL
SAMPLE SITE: ABNORMAL
SARS-COV-2, RAPID: NOT DETECTED
SEG NEUTROPHILS: 44 % (ref 36–65)
SEG NEUTROPHILS: 45 % (ref 36–65)
SEG NEUTROPHILS: 47 % (ref 36–65)
SEG NEUTROPHILS: 49 % (ref 36–65)
SEG NEUTROPHILS: 49 % (ref 36–65)
SEG NEUTROPHILS: 50 % (ref 36–65)
SEG NEUTROPHILS: 51 % (ref 36–65)
SEG NEUTROPHILS: 80 % (ref 36–66)
SEG NEUTROPHILS: 87 % (ref 36–66)
SEGMENTED NEUTROPHILS ABSOLUTE COUNT: 10.96 K/UL (ref 1.8–7.7)
SEGMENTED NEUTROPHILS ABSOLUTE COUNT: 10.96 K/UL (ref 1.8–7.7)
SEGMENTED NEUTROPHILS ABSOLUTE COUNT: 3.43 K/UL (ref 1.5–8.1)
SEGMENTED NEUTROPHILS ABSOLUTE COUNT: 3.54 K/UL (ref 1.5–8.1)
SEGMENTED NEUTROPHILS ABSOLUTE COUNT: 3.6 K/UL (ref 1.5–8.1)
SEGMENTED NEUTROPHILS ABSOLUTE COUNT: 3.87 K/UL (ref 1.5–8.1)
SEGMENTED NEUTROPHILS ABSOLUTE COUNT: 4.27 K/UL (ref 1.5–8.1)
SEGMENTED NEUTROPHILS ABSOLUTE COUNT: 4.54 K/UL (ref 1.5–8.1)
SEGMENTED NEUTROPHILS ABSOLUTE COUNT: 4.58 K/UL (ref 1.5–8.1)
SODIUM BLD-SCNC: 136 MMOL/L (ref 135–144)
SODIUM BLD-SCNC: 138 MMOL/L (ref 135–144)
SODIUM BLD-SCNC: 140 MMOL/L (ref 135–144)
SODIUM BLD-SCNC: 141 MMOL/L (ref 135–144)
SODIUM BLD-SCNC: 141 MMOL/L (ref 135–144)
SODIUM BLD-SCNC: 154 MMOL/L (ref 135–144)
SODIUM BLD-SCNC: 155 MMOL/L (ref 135–144)
SODIUM BLD-SCNC: 157 MMOL/L (ref 135–144)
SODIUM BLD-SCNC: 158 MMOL/L (ref 135–144)
SODIUM BLD-SCNC: 158 MMOL/L (ref 135–144)
SODIUM BLD-SCNC: 160 MMOL/L (ref 135–144)
SPECIFIC GRAVITY UA: 1.01 (ref 1.01–1.02)
SPECIFIC GRAVITY UA: 1.02 (ref 1–1.03)
SPECIMEN DESCRIPTION: NORMAL
TOTAL CK: 502 U/L (ref 26–192)
TOTAL PROTEIN, URINE: 83 MG/DL
TOTAL PROTEIN: 4.9 G/DL (ref 6.4–8.3)
TOTAL PROTEIN: 7.3 G/DL (ref 6.4–8.3)
TRIGL SERPL-MCNC: 161 MG/DL
TROPONIN, HIGH SENSITIVITY: 11 NG/L (ref 0–14)
TROPONIN, HIGH SENSITIVITY: 21 NG/L (ref 0–14)
TROPONIN, HIGH SENSITIVITY: 23 NG/L (ref 0–14)
TROPONIN, HIGH SENSITIVITY: 44 NG/L (ref 0–14)
TROPONIN, HIGH SENSITIVITY: 70 NG/L (ref 0–14)
TSH SERPL DL<=0.05 MIU/L-ACNC: 1.82 UIU/ML (ref 0.3–5)
TURBIDITY: ABNORMAL
URINE HGB: ABNORMAL
URINE HGB: NEGATIVE
UROBILINOGEN, URINE: NORMAL
UROBILINOGEN, URINE: NORMAL
VANCOMYCIN RANDOM: 28.8 UG/ML
WBC # BLD: 12.6 K/UL (ref 3.5–11.3)
WBC # BLD: 13.7 K/UL (ref 3.5–11.3)
WBC # BLD: 7.5 K/UL (ref 3.5–11.3)
WBC # BLD: 7.6 K/UL (ref 3.5–11.3)
WBC # BLD: 8 K/UL (ref 3.5–11.3)
WBC # BLD: 8 K/UL (ref 3.5–11.3)
WBC # BLD: 8.5 K/UL (ref 3.5–11.3)
WBC # BLD: 8.9 K/UL (ref 3.5–11.3)
WBC # BLD: 9.4 K/UL (ref 3.5–11.3)
WBC UA: ABNORMAL /HPF (ref 0–4)
WBC UA: ABNORMAL /HPF (ref 0–5)
ZZ NTE CLEAN UP: ORDERED TEST: NORMAL
ZZ NTE WITH NAME CLEAN UP: SPECIMEN SOURCE: NORMAL

## 2022-01-01 PROCEDURE — 84484 ASSAY OF TROPONIN QUANT: CPT

## 2022-01-01 PROCEDURE — 94760 N-INVAS EAR/PLS OXIMETRY 1: CPT

## 2022-01-01 PROCEDURE — 82570 ASSAY OF URINE CREATININE: CPT

## 2022-01-01 PROCEDURE — 2580000003 HC RX 258: Performed by: STUDENT IN AN ORGANIZED HEALTH CARE EDUCATION/TRAINING PROGRAM

## 2022-01-01 PROCEDURE — 2580000003 HC RX 258: Performed by: NURSE PRACTITIONER

## 2022-01-01 PROCEDURE — 93970 EXTREMITY STUDY: CPT

## 2022-01-01 PROCEDURE — 99213 OFFICE O/P EST LOW 20 MIN: CPT

## 2022-01-01 PROCEDURE — 83521 IG LIGHT CHAINS FREE EACH: CPT

## 2022-01-01 PROCEDURE — 2000000000 HC ICU R&B

## 2022-01-01 PROCEDURE — 86334 IMMUNOFIX E-PHORESIS SERUM: CPT

## 2022-01-01 PROCEDURE — 93010 ELECTROCARDIOGRAM REPORT: CPT | Performed by: INTERNAL MEDICINE

## 2022-01-01 PROCEDURE — 94660 CPAP INITIATION&MGMT: CPT

## 2022-01-01 PROCEDURE — 2060000000 HC ICU INTERMEDIATE R&B

## 2022-01-01 PROCEDURE — 6360000002 HC RX W HCPCS

## 2022-01-01 PROCEDURE — 87186 SC STD MICRODIL/AGAR DIL: CPT

## 2022-01-01 PROCEDURE — 71045 X-RAY EXAM CHEST 1 VIEW: CPT

## 2022-01-01 PROCEDURE — 87205 SMEAR GRAM STAIN: CPT

## 2022-01-01 PROCEDURE — 97116 GAIT TRAINING THERAPY: CPT

## 2022-01-01 PROCEDURE — 99285 EMERGENCY DEPT VISIT HI MDM: CPT

## 2022-01-01 PROCEDURE — 6370000000 HC RX 637 (ALT 250 FOR IP): Performed by: NURSE PRACTITIONER

## 2022-01-01 PROCEDURE — 85025 COMPLETE CBC W/AUTO DIFF WBC: CPT

## 2022-01-01 PROCEDURE — 84165 PROTEIN E-PHORESIS SERUM: CPT

## 2022-01-01 PROCEDURE — 80048 BASIC METABOLIC PNL TOTAL CA: CPT

## 2022-01-01 PROCEDURE — 04HK33Z INSERTION OF INFUSION DEVICE INTO RIGHT FEMORAL ARTERY, PERCUTANEOUS APPROACH: ICD-10-PCS | Performed by: EMERGENCY MEDICINE

## 2022-01-01 PROCEDURE — 6360000002 HC RX W HCPCS: Performed by: NURSE PRACTITIONER

## 2022-01-01 PROCEDURE — 74018 RADEX ABDOMEN 1 VIEW: CPT

## 2022-01-01 PROCEDURE — 97161 PT EVAL LOW COMPLEX 20 MIN: CPT

## 2022-01-01 PROCEDURE — 36415 COLL VENOUS BLD VENIPUNCTURE: CPT

## 2022-01-01 PROCEDURE — 87086 URINE CULTURE/COLONY COUNT: CPT

## 2022-01-01 PROCEDURE — 80202 ASSAY OF VANCOMYCIN: CPT

## 2022-01-01 PROCEDURE — 85055 RETICULATED PLATELET ASSAY: CPT

## 2022-01-01 PROCEDURE — C9113 INJ PANTOPRAZOLE SODIUM, VIA: HCPCS | Performed by: EMERGENCY MEDICINE

## 2022-01-01 PROCEDURE — 99232 SBSQ HOSP IP/OBS MODERATE 35: CPT | Performed by: INTERNAL MEDICINE

## 2022-01-01 PROCEDURE — 84100 ASSAY OF PHOSPHORUS: CPT

## 2022-01-01 PROCEDURE — 94002 VENT MGMT INPAT INIT DAY: CPT

## 2022-01-01 PROCEDURE — 96374 THER/PROPH/DIAG INJ IV PUSH: CPT

## 2022-01-01 PROCEDURE — 36620 INSERTION CATHETER ARTERY: CPT

## 2022-01-01 PROCEDURE — 80053 COMPREHEN METABOLIC PANEL: CPT

## 2022-01-01 PROCEDURE — 82805 BLOOD GASES W/O2 SATURATION: CPT

## 2022-01-01 PROCEDURE — 99291 CRITICAL CARE FIRST HOUR: CPT | Performed by: INTERNAL MEDICINE

## 2022-01-01 PROCEDURE — 2580000003 HC RX 258: Performed by: EMERGENCY MEDICINE

## 2022-01-01 PROCEDURE — 82947 ASSAY GLUCOSE BLOOD QUANT: CPT

## 2022-01-01 PROCEDURE — 86160 COMPLEMENT ANTIGEN: CPT

## 2022-01-01 PROCEDURE — 1123F ACP DISCUSS/DSCN MKR DOCD: CPT | Performed by: FAMILY MEDICINE

## 2022-01-01 PROCEDURE — PBSHW POCT GLUCOSE: Performed by: FAMILY MEDICINE

## 2022-01-01 PROCEDURE — C9113 INJ PANTOPRAZOLE SODIUM, VIA: HCPCS | Performed by: STUDENT IN AN ORGANIZED HEALTH CARE EDUCATION/TRAINING PROGRAM

## 2022-01-01 PROCEDURE — 92507 TX SP LANG VOICE COMM INDIV: CPT

## 2022-01-01 PROCEDURE — 5A1935Z RESPIRATORY VENTILATION, LESS THAN 24 CONSECUTIVE HOURS: ICD-10-PCS | Performed by: INTERNAL MEDICINE

## 2022-01-01 PROCEDURE — 6360000004 HC RX CONTRAST MEDICATION: Performed by: EMERGENCY MEDICINE

## 2022-01-01 PROCEDURE — 87040 BLOOD CULTURE FOR BACTERIA: CPT

## 2022-01-01 PROCEDURE — 6360000002 HC RX W HCPCS: Performed by: STUDENT IN AN ORGANIZED HEALTH CARE EDUCATION/TRAINING PROGRAM

## 2022-01-01 PROCEDURE — 73030 X-RAY EXAM OF SHOULDER: CPT

## 2022-01-01 PROCEDURE — 80061 LIPID PANEL: CPT

## 2022-01-01 PROCEDURE — 97165 OT EVAL LOW COMPLEX 30 MIN: CPT | Performed by: OCCUPATIONAL THERAPIST

## 2022-01-01 PROCEDURE — 37799 UNLISTED PX VASCULAR SURGERY: CPT

## 2022-01-01 PROCEDURE — 96365 THER/PROPH/DIAG IV INF INIT: CPT

## 2022-01-01 PROCEDURE — 97530 THERAPEUTIC ACTIVITIES: CPT

## 2022-01-01 PROCEDURE — 99231 SBSQ HOSP IP/OBS SF/LOW 25: CPT | Performed by: INTERNAL MEDICINE

## 2022-01-01 PROCEDURE — 99223 1ST HOSP IP/OBS HIGH 75: CPT | Performed by: PSYCHIATRY & NEUROLOGY

## 2022-01-01 PROCEDURE — 6360000002 HC RX W HCPCS: Performed by: EMERGENCY MEDICINE

## 2022-01-01 PROCEDURE — 83605 ASSAY OF LACTIC ACID: CPT

## 2022-01-01 PROCEDURE — 36556 INSERT NON-TUNNEL CV CATH: CPT

## 2022-01-01 PROCEDURE — 99223 1ST HOSP IP/OBS HIGH 75: CPT | Performed by: INTERNAL MEDICINE

## 2022-01-01 PROCEDURE — 97116 GAIT TRAINING THERAPY: CPT | Performed by: PHYSICAL THERAPY ASSISTANT

## 2022-01-01 PROCEDURE — 99214 OFFICE O/P EST MOD 30 MIN: CPT | Performed by: FAMILY MEDICINE

## 2022-01-01 PROCEDURE — 87641 MR-STAPH DNA AMP PROBE: CPT

## 2022-01-01 PROCEDURE — 82330 ASSAY OF CALCIUM: CPT

## 2022-01-01 PROCEDURE — 82803 BLOOD GASES ANY COMBINATION: CPT

## 2022-01-01 PROCEDURE — 84156 ASSAY OF PROTEIN URINE: CPT

## 2022-01-01 PROCEDURE — 99213 OFFICE O/P EST LOW 20 MIN: CPT | Performed by: FAMILY MEDICINE

## 2022-01-01 PROCEDURE — 2500000003 HC RX 250 WO HCPCS: Performed by: INTERNAL MEDICINE

## 2022-01-01 PROCEDURE — 92523 SPEECH SOUND LANG COMPREHEN: CPT | Performed by: SPEECH-LANGUAGE PATHOLOGIST

## 2022-01-01 PROCEDURE — 70450 CT HEAD/BRAIN W/O DYE: CPT

## 2022-01-01 PROCEDURE — 99238 HOSP IP/OBS DSCHRG MGMT 30/<: CPT | Performed by: INTERNAL MEDICINE

## 2022-01-01 PROCEDURE — 84155 ASSAY OF PROTEIN SERUM: CPT

## 2022-01-01 PROCEDURE — 96375 TX/PRO/DX INJ NEW DRUG ADDON: CPT

## 2022-01-01 PROCEDURE — 2700000000 HC OXYGEN THERAPY PER DAY

## 2022-01-01 PROCEDURE — 87154 CUL TYP ID BLD PTHGN 6+ TRGT: CPT

## 2022-01-01 PROCEDURE — 82550 ASSAY OF CK (CPK): CPT

## 2022-01-01 PROCEDURE — 86140 C-REACTIVE PROTEIN: CPT

## 2022-01-01 PROCEDURE — 99222 1ST HOSP IP/OBS MODERATE 55: CPT | Performed by: NURSE PRACTITIONER

## 2022-01-01 PROCEDURE — 0BH18EZ INSERTION OF ENDOTRACHEAL AIRWAY INTO TRACHEA, VIA NATURAL OR ARTIFICIAL OPENING ENDOSCOPIC: ICD-10-PCS | Performed by: EMERGENCY MEDICINE

## 2022-01-01 PROCEDURE — 93005 ELECTROCARDIOGRAM TRACING: CPT | Performed by: EMERGENCY MEDICINE

## 2022-01-01 PROCEDURE — 85730 THROMBOPLASTIN TIME PARTIAL: CPT

## 2022-01-01 PROCEDURE — 2500000003 HC RX 250 WO HCPCS: Performed by: STUDENT IN AN ORGANIZED HEALTH CARE EDUCATION/TRAINING PROGRAM

## 2022-01-01 PROCEDURE — 94761 N-INVAS EAR/PLS OXIMETRY MLT: CPT

## 2022-01-01 PROCEDURE — 76770 US EXAM ABDO BACK WALL COMP: CPT

## 2022-01-01 PROCEDURE — 02H633Z INSERTION OF INFUSION DEVICE INTO RIGHT ATRIUM, PERCUTANEOUS APPROACH: ICD-10-PCS | Performed by: EMERGENCY MEDICINE

## 2022-01-01 PROCEDURE — 83735 ASSAY OF MAGNESIUM: CPT

## 2022-01-01 PROCEDURE — 87635 SARS-COV-2 COVID-19 AMP PRB: CPT

## 2022-01-01 PROCEDURE — 87077 CULTURE AEROBIC IDENTIFY: CPT

## 2022-01-01 PROCEDURE — 6370000000 HC RX 637 (ALT 250 FOR IP): Performed by: STUDENT IN AN ORGANIZED HEALTH CARE EDUCATION/TRAINING PROGRAM

## 2022-01-01 PROCEDURE — 81001 URINALYSIS AUTO W/SCOPE: CPT

## 2022-01-01 PROCEDURE — 71260 CT THORAX DX C+: CPT

## 2022-01-01 PROCEDURE — 2500000003 HC RX 250 WO HCPCS

## 2022-01-01 PROCEDURE — 93005 ELECTROCARDIOGRAM TRACING: CPT | Performed by: STUDENT IN AN ORGANIZED HEALTH CARE EDUCATION/TRAINING PROGRAM

## 2022-01-01 PROCEDURE — 83880 ASSAY OF NATRIURETIC PEPTIDE: CPT

## 2022-01-01 PROCEDURE — 2580000003 HC RX 258: Performed by: INTERNAL MEDICINE

## 2022-01-01 PROCEDURE — 82962 GLUCOSE BLOOD TEST: CPT | Performed by: FAMILY MEDICINE

## 2022-01-01 PROCEDURE — 84443 ASSAY THYROID STIM HORMONE: CPT

## 2022-01-01 PROCEDURE — 83036 HEMOGLOBIN GLYCOSYLATED A1C: CPT

## 2022-01-01 RX ORDER — CALCIUM GLUCONATE 20 MG/ML
2000 INJECTION, SOLUTION INTRAVENOUS ONCE
Status: COMPLETED | OUTPATIENT
Start: 2022-01-01 | End: 2022-01-01

## 2022-01-01 RX ORDER — ENOXAPARIN SODIUM 100 MG/ML
40 INJECTION SUBCUTANEOUS DAILY
Status: DISCONTINUED | OUTPATIENT
Start: 2022-01-01 | End: 2022-01-01 | Stop reason: HOSPADM

## 2022-01-01 RX ORDER — SODIUM CHLORIDE 9 MG/ML
25 INJECTION, SOLUTION INTRAVENOUS PRN
Status: DISCONTINUED | OUTPATIENT
Start: 2022-01-01 | End: 2022-01-01 | Stop reason: HOSPADM

## 2022-01-01 RX ORDER — ASPIRIN 81 MG/1
81 TABLET ORAL DAILY
Status: DISCONTINUED | OUTPATIENT
Start: 2022-01-01 | End: 2022-01-01 | Stop reason: HOSPADM

## 2022-01-01 RX ORDER — GREEN TEA/HOODIA GORDONII 315-12.5MG
1 CAPSULE ORAL 3 TIMES DAILY
Qty: 30 TABLET | Refills: 0 | Status: SHIPPED | OUTPATIENT
Start: 2022-01-01 | End: 2022-01-01

## 2022-01-01 RX ORDER — POTASSIUM CHLORIDE 7.45 MG/ML
10 INJECTION INTRAVENOUS PRN
Status: DISCONTINUED | OUTPATIENT
Start: 2022-01-01 | End: 2022-01-01 | Stop reason: HOSPADM

## 2022-01-01 RX ORDER — ALBUTEROL SULFATE 2.5 MG/3ML
2.5 SOLUTION RESPIRATORY (INHALATION)
Status: DISCONTINUED | OUTPATIENT
Start: 2022-01-01 | End: 2022-01-01

## 2022-01-01 RX ORDER — SODIUM CHLORIDE, SODIUM LACTATE, POTASSIUM CHLORIDE, AND CALCIUM CHLORIDE .6; .31; .03; .02 G/100ML; G/100ML; G/100ML; G/100ML
1000 INJECTION, SOLUTION INTRAVENOUS ONCE
Status: COMPLETED | OUTPATIENT
Start: 2022-01-01 | End: 2022-01-01

## 2022-01-01 RX ORDER — M-VIT,TX,IRON,MINS/CALC/FOLIC 27MG-0.4MG
1 TABLET ORAL DAILY
COMMUNITY

## 2022-01-01 RX ORDER — ACETAMINOPHEN 325 MG/1
650 TABLET ORAL EVERY 6 HOURS PRN
Status: DISCONTINUED | OUTPATIENT
Start: 2022-01-01 | End: 2022-01-01 | Stop reason: HOSPADM

## 2022-01-01 RX ORDER — ONDANSETRON 4 MG/1
4 TABLET, ORALLY DISINTEGRATING ORAL EVERY 8 HOURS PRN
Status: DISCONTINUED | OUTPATIENT
Start: 2022-01-01 | End: 2022-01-01 | Stop reason: HOSPADM

## 2022-01-01 RX ORDER — ACETAMINOPHEN 650 MG/1
650 SUPPOSITORY RECTAL EVERY 6 HOURS PRN
Status: DISCONTINUED | OUTPATIENT
Start: 2022-01-01 | End: 2022-01-01 | Stop reason: HOSPADM

## 2022-01-01 RX ORDER — SENNA AND DOCUSATE SODIUM 50; 8.6 MG/1; MG/1
1 TABLET, FILM COATED ORAL 2 TIMES DAILY
Status: DISCONTINUED | OUTPATIENT
Start: 2022-01-01 | End: 2022-01-01 | Stop reason: HOSPADM

## 2022-01-01 RX ORDER — DEXTROSE MONOHYDRATE 100 MG/ML
INJECTION, SOLUTION INTRAVENOUS CONTINUOUS PRN
Status: DISCONTINUED | OUTPATIENT
Start: 2022-01-01 | End: 2022-01-01 | Stop reason: HOSPADM

## 2022-01-01 RX ORDER — NOREPINEPHRINE BIT/0.9 % NACL 16MG/250ML
2-100 INFUSION BOTTLE (ML) INTRAVENOUS CONTINUOUS
Status: DISCONTINUED | OUTPATIENT
Start: 2022-01-01 | End: 2022-01-01 | Stop reason: HOSPADM

## 2022-01-01 RX ORDER — SODIUM CHLORIDE 0.9 % (FLUSH) 0.9 %
5-40 SYRINGE (ML) INJECTION EVERY 12 HOURS SCHEDULED
Status: DISCONTINUED | OUTPATIENT
Start: 2022-01-01 | End: 2022-01-01 | Stop reason: HOSPADM

## 2022-01-01 RX ORDER — SODIUM CHLORIDE 9 MG/ML
INJECTION, SOLUTION INTRAVENOUS PRN
Status: DISCONTINUED | OUTPATIENT
Start: 2022-01-01 | End: 2022-01-01 | Stop reason: HOSPADM

## 2022-01-01 RX ORDER — SODIUM CHLORIDE 0.9 % (FLUSH) 0.9 %
5-40 SYRINGE (ML) INJECTION PRN
Status: DISCONTINUED | OUTPATIENT
Start: 2022-01-01 | End: 2022-01-01 | Stop reason: HOSPADM

## 2022-01-01 RX ORDER — DEXTROSE MONOHYDRATE 25 G/50ML
INJECTION, SOLUTION INTRAVENOUS
Status: DISCONTINUED
Start: 2022-01-01 | End: 2022-01-01 | Stop reason: HOSPADM

## 2022-01-01 RX ORDER — ENOXAPARIN SODIUM 100 MG/ML
40 INJECTION SUBCUTANEOUS DAILY
Status: DISCONTINUED | OUTPATIENT
Start: 2022-01-01 | End: 2022-01-01

## 2022-01-01 RX ORDER — ONDANSETRON 2 MG/ML
4 INJECTION INTRAMUSCULAR; INTRAVENOUS EVERY 6 HOURS PRN
Status: DISCONTINUED | OUTPATIENT
Start: 2022-01-01 | End: 2022-01-01 | Stop reason: HOSPADM

## 2022-01-01 RX ORDER — SODIUM CHLORIDE 9 MG/ML
INJECTION, SOLUTION INTRAVENOUS CONTINUOUS
Status: DISCONTINUED | OUTPATIENT
Start: 2022-01-01 | End: 2022-01-01 | Stop reason: ALTCHOICE

## 2022-01-01 RX ORDER — NOREPINEPHRINE BIT/0.9 % NACL 16MG/250ML
1-100 INFUSION BOTTLE (ML) INTRAVENOUS CONTINUOUS
Status: DISCONTINUED | OUTPATIENT
Start: 2022-01-01 | End: 2022-01-01 | Stop reason: SDUPTHER

## 2022-01-01 RX ORDER — LANOLIN ALCOHOL/MO/W.PET/CERES
3 CREAM (GRAM) TOPICAL NIGHTLY PRN
Status: DISCONTINUED | OUTPATIENT
Start: 2022-01-01 | End: 2022-01-01 | Stop reason: HOSPADM

## 2022-01-01 RX ORDER — CEFDINIR 300 MG/1
300 CAPSULE ORAL 2 TIMES DAILY
Qty: 6 CAPSULE | Refills: 0 | Status: SHIPPED | OUTPATIENT
Start: 2022-01-01 | End: 2022-01-01

## 2022-01-01 RX ORDER — DOCUSATE SODIUM 100 MG/1
100 CAPSULE, LIQUID FILLED ORAL 2 TIMES DAILY
Status: DISCONTINUED | OUTPATIENT
Start: 2022-01-01 | End: 2022-01-01 | Stop reason: HOSPADM

## 2022-01-01 RX ORDER — POTASSIUM CHLORIDE 29.8 MG/ML
20 INJECTION INTRAVENOUS
Status: COMPLETED | OUTPATIENT
Start: 2022-01-01 | End: 2022-01-01

## 2022-01-01 RX ORDER — SODIUM CHLORIDE FOR INHALATION 0.9 %
3 VIAL, NEBULIZER (ML) INHALATION
Status: DISCONTINUED | OUTPATIENT
Start: 2022-01-01 | End: 2022-01-01

## 2022-01-01 RX ORDER — POTASSIUM CHLORIDE 29.8 MG/ML
20 INJECTION INTRAVENOUS PRN
Status: DISCONTINUED | OUTPATIENT
Start: 2022-01-01 | End: 2022-01-01 | Stop reason: HOSPADM

## 2022-01-01 RX ORDER — PROPOFOL 10 MG/ML
5-50 INJECTION, EMULSION INTRAVENOUS CONTINUOUS
Status: DISCONTINUED | OUTPATIENT
Start: 2022-01-01 | End: 2022-01-01 | Stop reason: HOSPADM

## 2022-01-01 RX ORDER — HEPARIN SODIUM 5000 [USP'U]/ML
5000 INJECTION, SOLUTION INTRAVENOUS; SUBCUTANEOUS EVERY 8 HOURS SCHEDULED
Status: DISCONTINUED | OUTPATIENT
Start: 2022-01-01 | End: 2022-01-01 | Stop reason: HOSPADM

## 2022-01-01 RX ORDER — MIDAZOLAM HYDROCHLORIDE 2 MG/2ML
2 INJECTION, SOLUTION INTRAMUSCULAR; INTRAVENOUS ONCE
Status: COMPLETED | OUTPATIENT
Start: 2022-01-01 | End: 2022-01-01

## 2022-01-01 RX ORDER — SODIUM CHLORIDE 9 MG/ML
INJECTION, SOLUTION INTRAVENOUS CONTINUOUS
Status: DISCONTINUED | OUTPATIENT
Start: 2022-01-01 | End: 2022-01-01

## 2022-01-01 RX ORDER — POLYETHYLENE GLYCOL 3350 17 G/17G
17 POWDER, FOR SOLUTION ORAL DAILY PRN
Status: DISCONTINUED | OUTPATIENT
Start: 2022-01-01 | End: 2022-01-01 | Stop reason: HOSPADM

## 2022-01-01 RX ORDER — ASPIRIN 81 MG/1
81 TABLET ORAL DAILY
Qty: 30 TABLET | Refills: 0 | Status: SHIPPED | OUTPATIENT
Start: 2022-01-01

## 2022-01-01 RX ORDER — ALBUTEROL SULFATE 2.5 MG/3ML
2.5 SOLUTION RESPIRATORY (INHALATION)
Status: DISCONTINUED | OUTPATIENT
Start: 2022-01-01 | End: 2022-01-01 | Stop reason: HOSPADM

## 2022-01-01 RX ADMIN — DOCUSATE SODIUM 100 MG: 100 CAPSULE, LIQUID FILLED ORAL at 09:33

## 2022-01-01 RX ADMIN — PIPERACILLIN AND TAZOBACTAM 3375 MG: 3; .375 INJECTION, POWDER, FOR SOLUTION INTRAVENOUS at 04:26

## 2022-01-01 RX ADMIN — CEFAZOLIN 1000 MG: 1 INJECTION, POWDER, FOR SOLUTION INTRAMUSCULAR; INTRAVENOUS at 04:27

## 2022-01-01 RX ADMIN — SODIUM CHLORIDE: 9 INJECTION, SOLUTION INTRAVENOUS at 16:20

## 2022-01-01 RX ADMIN — DOCUSATE SODIUM 100 MG: 100 CAPSULE, LIQUID FILLED ORAL at 09:15

## 2022-01-01 RX ADMIN — ENOXAPARIN SODIUM 40 MG: 100 INJECTION SUBCUTANEOUS at 08:09

## 2022-01-01 RX ADMIN — Medication 1250 MG: at 21:23

## 2022-01-01 RX ADMIN — Medication 100 MCG/MIN: at 07:27

## 2022-01-01 RX ADMIN — CEFAZOLIN 1000 MG: 1 INJECTION, POWDER, FOR SOLUTION INTRAMUSCULAR; INTRAVENOUS at 10:55

## 2022-01-01 RX ADMIN — CEFAZOLIN 1000 MG: 1 INJECTION, POWDER, FOR SOLUTION INTRAMUSCULAR; INTRAVENOUS at 09:23

## 2022-01-01 RX ADMIN — Medication 50 MEQ: at 07:52

## 2022-01-01 RX ADMIN — SODIUM CHLORIDE 25 ML: 9 INJECTION, SOLUTION INTRAVENOUS at 18:07

## 2022-01-01 RX ADMIN — CEFAZOLIN 1000 MG: 1 INJECTION, POWDER, FOR SOLUTION INTRAMUSCULAR; INTRAVENOUS at 02:34

## 2022-01-01 RX ADMIN — SODIUM CHLORIDE, PRESERVATIVE FREE 10 ML: 5 INJECTION INTRAVENOUS at 21:22

## 2022-01-01 RX ADMIN — SODIUM BICARBONATE: 84 INJECTION, SOLUTION INTRAVENOUS at 10:53

## 2022-01-01 RX ADMIN — ASPIRIN 81 MG: 81 TABLET, COATED ORAL at 08:09

## 2022-01-01 RX ADMIN — Medication 50 MEQ: at 07:16

## 2022-01-01 RX ADMIN — Medication 100 MCG/MIN: at 09:56

## 2022-01-01 RX ADMIN — Medication 50 MEQ: at 02:01

## 2022-01-01 RX ADMIN — ENOXAPARIN SODIUM 40 MG: 100 INJECTION SUBCUTANEOUS at 08:27

## 2022-01-01 RX ADMIN — CEFAZOLIN 1000 MG: 1 INJECTION, POWDER, FOR SOLUTION INTRAMUSCULAR; INTRAVENOUS at 19:20

## 2022-01-01 RX ADMIN — Medication 125 ML: at 09:43

## 2022-01-01 RX ADMIN — CEFAZOLIN 1000 MG: 1 INJECTION, POWDER, FOR SOLUTION INTRAMUSCULAR; INTRAVENOUS at 02:47

## 2022-01-01 RX ADMIN — Medication 4 MG/HR: at 23:42

## 2022-01-01 RX ADMIN — SODIUM BICARBONATE 50 MEQ: 84 INJECTION, SOLUTION INTRAVENOUS at 07:52

## 2022-01-01 RX ADMIN — SODIUM CHLORIDE, PRESERVATIVE FREE 10 ML: 5 INJECTION INTRAVENOUS at 20:16

## 2022-01-01 RX ADMIN — DOCUSATE SODIUM 100 MG: 100 CAPSULE, LIQUID FILLED ORAL at 09:24

## 2022-01-01 RX ADMIN — DOCUSATE SODIUM 100 MG: 100 CAPSULE, LIQUID FILLED ORAL at 08:27

## 2022-01-01 RX ADMIN — CEFAZOLIN 1000 MG: 1 INJECTION, POWDER, FOR SOLUTION INTRAMUSCULAR; INTRAVENOUS at 18:39

## 2022-01-01 RX ADMIN — SODIUM CHLORIDE: 9 INJECTION, SOLUTION INTRAVENOUS at 02:24

## 2022-01-01 RX ADMIN — CEFAZOLIN 1000 MG: 1 INJECTION, POWDER, FOR SOLUTION INTRAMUSCULAR; INTRAVENOUS at 02:45

## 2022-01-01 RX ADMIN — MIDAZOLAM 2 MG: 1 INJECTION INTRAMUSCULAR; INTRAVENOUS at 22:42

## 2022-01-01 RX ADMIN — Medication 7.5 MCG/MIN: at 23:45

## 2022-01-01 RX ADMIN — PHENYLEPHRINE HYDROCHLORIDE 300 MCG/MIN: 10 INJECTION INTRAVENOUS at 11:30

## 2022-01-01 RX ADMIN — CEFAZOLIN 1000 MG: 1 INJECTION, POWDER, FOR SOLUTION INTRAMUSCULAR; INTRAVENOUS at 10:39

## 2022-01-01 RX ADMIN — IOPAMIDOL 100 ML: 755 INJECTION, SOLUTION INTRAVENOUS at 16:36

## 2022-01-01 RX ADMIN — DOCUSATE SODIUM 100 MG: 100 CAPSULE, LIQUID FILLED ORAL at 21:22

## 2022-01-01 RX ADMIN — ASPIRIN 81 MG: 81 TABLET, COATED ORAL at 08:27

## 2022-01-01 RX ADMIN — Medication 3 MG: at 01:52

## 2022-01-01 RX ADMIN — POLYETHYLENE GLYCOL 3350 17 G: 17 POWDER, FOR SOLUTION ORAL at 23:25

## 2022-01-01 RX ADMIN — ENOXAPARIN SODIUM 40 MG: 100 INJECTION SUBCUTANEOUS at 23:07

## 2022-01-01 RX ADMIN — SODIUM CHLORIDE 25 ML: 9 INJECTION, SOLUTION INTRAVENOUS at 09:22

## 2022-01-01 RX ADMIN — SODIUM CHLORIDE, PRESERVATIVE FREE 10 ML: 5 INJECTION INTRAVENOUS at 09:24

## 2022-01-01 RX ADMIN — SODIUM CHLORIDE 80 MG: 9 INJECTION, SOLUTION INTRAVENOUS at 04:21

## 2022-01-01 RX ADMIN — SODIUM CHLORIDE, PRESERVATIVE FREE 10 ML: 5 INJECTION INTRAVENOUS at 04:29

## 2022-01-01 RX ADMIN — ENOXAPARIN SODIUM 40 MG: 100 INJECTION SUBCUTANEOUS at 09:15

## 2022-01-01 RX ADMIN — SODIUM CHLORIDE: 9 INJECTION, SOLUTION INTRAVENOUS at 21:00

## 2022-01-01 RX ADMIN — SODIUM CHLORIDE, PRESERVATIVE FREE 10 ML: 5 INJECTION INTRAVENOUS at 19:09

## 2022-01-01 RX ADMIN — CEFAZOLIN 1000 MG: 1 INJECTION, POWDER, FOR SOLUTION INTRAMUSCULAR; INTRAVENOUS at 18:08

## 2022-01-01 RX ADMIN — CEFAZOLIN 1000 MG: 1 INJECTION, POWDER, FOR SOLUTION INTRAMUSCULAR; INTRAVENOUS at 09:37

## 2022-01-01 RX ADMIN — SODIUM CHLORIDE, PRESERVATIVE FREE 10 ML: 5 INJECTION INTRAVENOUS at 19:57

## 2022-01-01 RX ADMIN — Medication 100 MCG/MIN: at 14:17

## 2022-01-01 RX ADMIN — SODIUM CHLORIDE: 9 INJECTION, SOLUTION INTRAVENOUS at 10:08

## 2022-01-01 RX ADMIN — SODIUM CHLORIDE, PRESERVATIVE FREE 10 ML: 5 INJECTION INTRAVENOUS at 09:33

## 2022-01-01 RX ADMIN — PHENYLEPHRINE HYDROCHLORIDE 30 MCG/MIN: 10 INJECTION INTRAVENOUS at 02:56

## 2022-01-01 RX ADMIN — HYDROCORTISONE SODIUM SUCCINATE 100 MG: 100 INJECTION, POWDER, FOR SOLUTION INTRAMUSCULAR; INTRAVENOUS at 22:42

## 2022-01-01 RX ADMIN — ASPIRIN 81 MG: 81 TABLET, COATED ORAL at 09:33

## 2022-01-01 RX ADMIN — SODIUM CHLORIDE 8 MG/HR: 9 INJECTION, SOLUTION INTRAVENOUS at 05:52

## 2022-01-01 RX ADMIN — VASOPRESSIN 0.04 UNITS/MIN: 20 INJECTION INTRAVENOUS at 06:01

## 2022-01-01 RX ADMIN — SODIUM CHLORIDE, PRESERVATIVE FREE 10 ML: 5 INJECTION INTRAVENOUS at 08:30

## 2022-01-01 RX ADMIN — CEFAZOLIN 1000 MG: 1 INJECTION, POWDER, FOR SOLUTION INTRAMUSCULAR; INTRAVENOUS at 02:24

## 2022-01-01 RX ADMIN — CEFAZOLIN 1000 MG: 1 INJECTION, POWDER, FOR SOLUTION INTRAMUSCULAR; INTRAVENOUS at 18:07

## 2022-01-01 RX ADMIN — ASPIRIN 81 MG: 81 TABLET, COATED ORAL at 09:15

## 2022-01-01 RX ADMIN — PHENYLEPHRINE HYDROCHLORIDE 300 MCG/MIN: 10 INJECTION INTRAVENOUS at 14:14

## 2022-01-01 RX ADMIN — HEPARIN SODIUM 5000 UNITS: 5000 INJECTION INTRAVENOUS; SUBCUTANEOUS at 13:44

## 2022-01-01 RX ADMIN — Medication 50 MEQ: at 10:05

## 2022-01-01 RX ADMIN — INSULIN HUMAN 10 UNITS: 100 INJECTION, SOLUTION PARENTERAL at 08:18

## 2022-01-01 RX ADMIN — DOCUSATE SODIUM 100 MG: 100 CAPSULE, LIQUID FILLED ORAL at 20:23

## 2022-01-01 RX ADMIN — Medication 125 ML: at 13:03

## 2022-01-01 RX ADMIN — SODIUM CHLORIDE, POTASSIUM CHLORIDE, SODIUM LACTATE AND CALCIUM CHLORIDE 1000 ML: 600; 310; 30; 20 INJECTION, SOLUTION INTRAVENOUS at 23:00

## 2022-01-01 RX ADMIN — EPINEPHRINE 1 MCG/MIN: 1 INJECTION INTRAMUSCULAR; INTRAVENOUS; SUBCUTANEOUS at 09:18

## 2022-01-01 RX ADMIN — SODIUM BICARBONATE 50 MEQ: 84 INJECTION INTRAVENOUS at 10:05

## 2022-01-01 RX ADMIN — SODIUM CHLORIDE 25 ML: 9 INJECTION, SOLUTION INTRAVENOUS at 09:36

## 2022-01-01 RX ADMIN — ENOXAPARIN SODIUM 40 MG: 100 INJECTION SUBCUTANEOUS at 09:24

## 2022-01-01 RX ADMIN — CEFAZOLIN 1000 MG: 1 INJECTION, POWDER, FOR SOLUTION INTRAMUSCULAR; INTRAVENOUS at 11:16

## 2022-01-01 RX ADMIN — INSULIN HUMAN 10 UNITS: 100 INJECTION, SOLUTION PARENTERAL at 15:58

## 2022-01-01 RX ADMIN — ASPIRIN 81 MG: 81 TABLET, COATED ORAL at 08:29

## 2022-01-01 RX ADMIN — DEXTROSE MONOHYDRATE 250 ML: 100 INJECTION, SOLUTION INTRAVENOUS at 08:21

## 2022-01-01 RX ADMIN — CALCIUM GLUCONATE 2000 MG: 20 INJECTION, SOLUTION INTRAVENOUS at 02:27

## 2022-01-01 RX ADMIN — HEPARIN SODIUM 5000 UNITS: 5000 INJECTION INTRAVENOUS; SUBCUTANEOUS at 05:55

## 2022-01-01 RX ADMIN — POTASSIUM CHLORIDE 20 MEQ: 29.8 INJECTION, SOLUTION INTRAVENOUS at 03:02

## 2022-01-01 RX ADMIN — POTASSIUM CHLORIDE 20 MEQ: 29.8 INJECTION, SOLUTION INTRAVENOUS at 02:00

## 2022-01-01 RX ADMIN — Medication 3 MG: at 02:26

## 2022-01-01 RX ADMIN — Medication 45 MCG/MIN: at 04:22

## 2022-01-01 RX ADMIN — CEFAZOLIN 1000 MG: 1 INJECTION, POWDER, FOR SOLUTION INTRAMUSCULAR; INTRAVENOUS at 10:00

## 2022-01-01 RX ADMIN — SODIUM BICARBONATE 50 MEQ: 84 INJECTION INTRAVENOUS at 02:01

## 2022-01-01 RX ADMIN — DEXTROSE MONOHYDRATE 250 ML: 100 INJECTION, SOLUTION INTRAVENOUS at 15:59

## 2022-01-01 RX ADMIN — Medication 100 MCG/MIN: at 12:09

## 2022-01-01 RX ADMIN — DOCUSATE SODIUM 100 MG: 100 CAPSULE, LIQUID FILLED ORAL at 19:56

## 2022-01-01 RX ADMIN — SODIUM CHLORIDE 25 ML: 9 INJECTION, SOLUTION INTRAVENOUS at 18:04

## 2022-01-01 RX ADMIN — CEFAZOLIN 1000 MG: 1 INJECTION, POWDER, FOR SOLUTION INTRAMUSCULAR; INTRAVENOUS at 18:04

## 2022-01-01 RX ADMIN — SODIUM CHLORIDE, PRESERVATIVE FREE 10 ML: 5 INJECTION INTRAVENOUS at 20:23

## 2022-01-01 RX ADMIN — DOCUSATE SODIUM 100 MG: 100 CAPSULE, LIQUID FILLED ORAL at 20:16

## 2022-01-01 RX ADMIN — ASPIRIN 81 MG: 81 TABLET, COATED ORAL at 23:08

## 2022-01-01 RX ADMIN — SODIUM CHLORIDE 25 ML: 9 INJECTION, SOLUTION INTRAVENOUS at 18:06

## 2022-01-01 RX ADMIN — CEFAZOLIN 1000 MG: 1 INJECTION, POWDER, FOR SOLUTION INTRAMUSCULAR; INTRAVENOUS at 18:40

## 2022-01-01 RX ADMIN — PHENYLEPHRINE HYDROCHLORIDE 300 MCG/MIN: 10 INJECTION INTRAVENOUS at 08:29

## 2022-01-01 RX ADMIN — EPINEPHRINE 20 MCG/MIN: 1 INJECTION INTRAMUSCULAR; INTRAVENOUS; SUBCUTANEOUS at 14:13

## 2022-01-01 RX ADMIN — ENOXAPARIN SODIUM 40 MG: 100 INJECTION SUBCUTANEOUS at 09:33

## 2022-01-01 RX ADMIN — ASPIRIN 81 MG: 81 TABLET, COATED ORAL at 09:24

## 2022-01-01 RX ADMIN — SODIUM CHLORIDE, PRESERVATIVE FREE 10 ML: 5 INJECTION INTRAVENOUS at 02:35

## 2022-01-01 RX ADMIN — SODIUM CHLORIDE 8 MG/HR: 9 INJECTION, SOLUTION INTRAVENOUS at 14:24

## 2022-01-01 RX ADMIN — SODIUM BICARBONATE 50 MEQ: 84 INJECTION INTRAVENOUS at 07:16

## 2022-01-01 RX ADMIN — VASOPRESSIN 0.04 UNITS/MIN: 20 INJECTION INTRAVENOUS at 12:23

## 2022-01-01 RX ADMIN — SODIUM CHLORIDE, PRESERVATIVE FREE 10 ML: 5 INJECTION INTRAVENOUS at 09:20

## 2022-01-01 RX ADMIN — SODIUM CHLORIDE, PRESERVATIVE FREE 10 ML: 5 INJECTION INTRAVENOUS at 23:08

## 2022-01-01 RX ADMIN — DOCUSATE SODIUM 100 MG: 100 CAPSULE, LIQUID FILLED ORAL at 08:29

## 2022-01-01 RX ADMIN — ENOXAPARIN SODIUM 40 MG: 100 INJECTION SUBCUTANEOUS at 08:29

## 2022-01-01 RX ADMIN — Medication 3 MG: at 00:19

## 2022-01-01 RX ADMIN — DOCUSATE SODIUM 100 MG: 100 CAPSULE, LIQUID FILLED ORAL at 08:09

## 2022-01-01 RX ADMIN — PIPERACILLIN AND TAZOBACTAM 3375 MG: 3; .375 INJECTION, POWDER, FOR SOLUTION INTRAVENOUS at 10:10

## 2022-01-01 RX ADMIN — Medication 3 MG: at 23:14

## 2022-01-01 SDOH — ECONOMIC STABILITY: FOOD INSECURITY: WITHIN THE PAST 12 MONTHS, YOU WORRIED THAT YOUR FOOD WOULD RUN OUT BEFORE YOU GOT MONEY TO BUY MORE.: NEVER TRUE

## 2022-01-01 SDOH — ECONOMIC STABILITY: FOOD INSECURITY: WITHIN THE PAST 12 MONTHS, THE FOOD YOU BOUGHT JUST DIDN'T LAST AND YOU DIDN'T HAVE MONEY TO GET MORE.: NEVER TRUE

## 2022-01-01 ASSESSMENT — PATIENT HEALTH QUESTIONNAIRE - PHQ9
SUM OF ALL RESPONSES TO PHQ QUESTIONS 1-9: 2
SUM OF ALL RESPONSES TO PHQ QUESTIONS 1-9: 2
SUM OF ALL RESPONSES TO PHQ9 QUESTIONS 1 & 2: 2
SUM OF ALL RESPONSES TO PHQ QUESTIONS 1-9: 2
SUM OF ALL RESPONSES TO PHQ QUESTIONS 1-9: 2
1. LITTLE INTEREST OR PLEASURE IN DOING THINGS: 1
2. FEELING DOWN, DEPRESSED OR HOPELESS: 1

## 2022-01-01 ASSESSMENT — ENCOUNTER SYMPTOMS
DIARRHEA: 0
SHORTNESS OF BREATH: 0
ABDOMINAL PAIN: 0
COUGH: 0
CHEST TIGHTNESS: 0
CONSTIPATION: 0
WHEEZING: 0
PHOTOPHOBIA: 0
SHORTNESS OF BREATH: 0
ABDOMINAL PAIN: 0
COUGH: 0
EYE PAIN: 0
BLOOD IN STOOL: 0
CHOKING: 0
VOMITING: 0
NAUSEA: 0
BACK PAIN: 0

## 2022-01-01 ASSESSMENT — PULMONARY FUNCTION TESTS
PIF_VALUE: 28
PIF_VALUE: 28
PIF_VALUE: 32
PIF_VALUE: 30
PIF_VALUE: 29
PIF_VALUE: 29
PIF_VALUE: 25

## 2022-01-01 ASSESSMENT — PAIN - FUNCTIONAL ASSESSMENT: PAIN_FUNCTIONAL_ASSESSMENT: NONE - DENIES PAIN

## 2022-01-01 ASSESSMENT — SOCIAL DETERMINANTS OF HEALTH (SDOH): HOW HARD IS IT FOR YOU TO PAY FOR THE VERY BASICS LIKE FOOD, HOUSING, MEDICAL CARE, AND HEATING?: NOT HARD AT ALL

## 2022-07-01 NOTE — TELEPHONE ENCOUNTER
Received call from [facility] at [facility] with [employer]. Subjective: Caller states \"feet swollen\"     Current Symptoms: bilateral foot edema, worse than usual, redness on lower legs. Can walk is uncomfortable. No numbness of tingling. Warm to touch. No breaks in the skin. No shortness of breath or chest pain. Has HTN and DM. Left breast has a lump the size of a golf ball, no pain or change on the skin has been there for months. Unsure of mammogram status. Patient doesn't have a PCP. Writer inquired about patient's speech, as it hard to understand (she is in background), caller states she thinks she had a mini stroke a few weeks ago, but was not evaluated. Onset: 3 months ago; worsening    Associated Symptoms: NA    Pain Severity: no pain verbalized    Temperature: no fever     What has been tried: nothing    LMP: NA Pregnant: NA    Recommended disposition: See in Office Today    Care advice provided, patient verbalizes understanding; denies any other questions or concerns; instructed to call back for any new or worsening symptoms. Patient/Caller agrees with recommended disposition; writer provided warm transfer to [place] at [facility] for appointment scheduling new patient consult and caller advised to get patient to [facility] today. Attention Provider: Thank you for allowing me to participate in the care of your patient. The patient was connected to triage in response to information provided to the ECC/PSC. Please do not respond through this encounter as the response is not directed to a shared pool.           Reason for Disposition   Breast lump   Patient wants to be seen    Protocols used: BREAST SYMPTOMS-ADULT-[place], LEG SWELLING AND EDEMA-ADULT-[place]

## 2022-07-08 PROBLEM — E66.09 CLASS 1 OBESITY DUE TO EXCESS CALORIES WITH SERIOUS COMORBIDITY AND BODY MASS INDEX (BMI) OF 34.0 TO 34.9 IN ADULT: Status: ACTIVE | Noted: 2022-01-01

## 2022-07-08 PROBLEM — L03.115 BILATERAL LOWER LEG CELLULITIS: Status: ACTIVE | Noted: 2022-01-01

## 2022-07-08 PROBLEM — R51.9 FREQUENT HEADACHES: Status: ACTIVE | Noted: 2022-01-01

## 2022-07-08 PROBLEM — M79.89 LEG SWELLING: Status: ACTIVE | Noted: 2018-01-01

## 2022-07-08 PROBLEM — R26.9 IMPAIRED GAIT: Status: ACTIVE | Noted: 2022-01-01

## 2022-07-08 PROBLEM — L03.116 BILATERAL LOWER LEG CELLULITIS: Status: ACTIVE | Noted: 2022-01-01

## 2022-07-08 PROBLEM — R91.1 RIGHT UPPER LOBE PULMONARY NODULE: Status: ACTIVE | Noted: 2022-01-01

## 2022-07-08 PROBLEM — R47.01 EXPRESSIVE APHASIA: Status: ACTIVE | Noted: 2021-01-01

## 2022-07-08 PROBLEM — G47.00 INSOMNIA: Status: ACTIVE | Noted: 2022-01-01

## 2022-07-08 PROBLEM — E86.0 DEHYDRATION: Status: ACTIVE | Noted: 2022-01-01

## 2022-07-08 PROBLEM — R29.90 STROKE-LIKE SYMPTOMS: Status: ACTIVE | Noted: 2021-01-01

## 2022-07-08 PROBLEM — N63.20 LEFT BREAST MASS: Status: ACTIVE | Noted: 2022-01-01

## 2022-07-08 NOTE — PROGRESS NOTES
Name: Radha Zeng  DOS: 7/8/2022  MRN: [id number]      Subjective:  Radha Zeng is a 76 y.o. female being seen for   Chief Complaint   Patient presents with    Hypertension     Establish care    Diabetes    Breast Mass     She found lump left beast one month ago    Leg Swelling     She says they have been swollen \"forever\"       Vitals:    07/08/22 1305   BP: 138/60   Pulse: 82   Resp: 16   Temp: 97.1 °F (36.2 °C)   SpO2: 96%     No Known Allergies  Past Medical History:   Diagnosis Date    Cancer ([facility]     Diabetes mellitus ([facility]     GERD (gastroesophageal reflux disease)     Hyperlipidemia     Hypertension      Past Surgical History:   Procedure Laterality Date    APPENDECTOMY      CHOLECYSTECTOMY      COLON SURGERY       Social History     Socioeconomic History    Marital status:      Spouse name: Not on file    Number of children: Not on file    Years of education: Not on file    Highest education level: Not on file   Occupational History    Not on file   Tobacco Use    Smoking status: Never Smoker    Smokeless tobacco: Never Used   Substance and Sexual Activity    Alcohol use: No    Drug use: No    Sexual activity: Not on file   Other Topics Concern    Not on file   Social History Narrative    Not on file     Social Determinants of Health     Financial Resource Strain: Low Risk     Difficulty of Paying Living Expenses: Not hard at all   Food Insecurity: No Food Insecurity    Worried About Running Out of Food in the Last Year: Never true    Fifi of Food in the Last Year: Never true   Transportation Needs:     Lack of Transportation (Medical): Not on file    Lack of Transportation (Non-Medical):  Not on file   Physical Activity:     Days of Exercise per Week: Not on file    Minutes of Exercise per Session: Not on file   Stress:     Feeling of Stress : Not on file   Social Connections:     Frequency of Communication with Friends and Family: Not on file    Frequency of Social Gatherings with Friends and Family: Not on file    Attends Mandaen Services: Not on file    Active Member of Clubs or Organizations: Not on file    Attends Club or Organization Meetings: Not on file    Marital Status: Not on file   Intimate Partner Violence:     Fear of Current or Ex-Partner: Not on file    Emotionally Abused: Not on file    Physically Abused: Not on file    Sexually Abused: Not on file   Housing Stability:     Unable to Pay for Housing in the Last Year: Not on file    Number of [place] in the Last Year: Not on file    Unstable Housing in the Last Year: Not on file       Current Outpatient Medications   Medication Sig Dispense Refill    Multiple Vitamins-Minerals (THERAPEUTIC MULTIVITAMIN-MINERALS) tablet Take 1 tablet by mouth daily      melatonin 3 MG TABS tablet Take 3 mg by mouth nightly as needed      Aspirin-Acetaminophen-Caffeine (EXCEDRIN EXTRA STRENGTH PO) Take by mouth      clindamycin (CLEOCIN) 300 MG capsule Take 300 mg by mouth daily (Patient not taking: Reported on 1/2/6645)      Garlic 1466 MG CAPS Take by mouth (Patient not taking: Reported on 7/8/2022)       No current facility-administered medications for this visit. Objective:  Pt is here because her legs are swollen. The [employer] drove her here. For many years she doesn't tell me how many years. Upon further questioning she has lost 20 lbs in a year she says she has a scale at home. Upon further questioning she has had trouble speaking for 1 year and people can ot understand her on the phone. Also she has a left breast mass which she thinks is cancer she noticed it a couple of months (2 months I clarified)  She says she is not sob. Review of Systems   Constitutional: Positive for fatigue (always tired). Negative for unexpected weight change. Eyes: Negative for photophobia and visual disturbance.    Respiratory: Negative for cough, choking, chest tightness, shortness of breath and wheezing. Cardiovascular: Positive for leg swelling (she says forever does not tell me how many years). Negative for chest pain and palpitations. Gastrointestinal: Negative for abdominal pain. Genitourinary: Positive for dysuria (hurts when she urinates for 2 weeks) and flank pain. Negative for difficulty urinating, frequency, hematuria and urgency. Musculoskeletal: Negative for arthralgias and myalgias. Skin: Negative for rash and wound. Neurological: Negative for dizziness, tremors, syncope, weakness, light-headedness, numbness and headaches. Hematological: Negative for adenopathy. Does not bruise/bleed easily. Psychiatric/Behavioral: Positive for confusion (she can not find her keys every day, her neighbor has a key to her house , she still cant find her key to her house). Negative for agitation, decreased concentration and suicidal ideas. Physical Exam  Constitutional:       Appearance: Normal appearance. She is obese. She is ill-appearing. She is not diaphoretic. Comments: Speech is grossly impaired   HENT:      Head: Normocephalic and atraumatic. Right Ear: External ear normal.      Left Ear: External ear normal.      Nose: Nose normal. No congestion or rhinorrhea. Mouth/Throat:      Mouth: Mucous membranes are moist.      Pharynx: Oropharynx is clear. Eyes:      Extraocular Movements: Extraocular movements intact. Conjunctiva/sclera: Conjunctivae normal.      Pupils: Pupils are equal, round, and reactive to light. Cardiovascular:      Rate and Rhythm: Normal rate and regular rhythm. Pulses: Normal pulses. Heart sounds: Normal heart sounds. No murmur heard. Pulmonary:      Effort: Pulmonary effort is normal.      Breath sounds: Normal breath sounds. No wheezing, rhonchi or rales. Chest:   Breasts:      Left: Mass (large mobile non tender left breast mass) present.             Comments: Large golf ball sized breast mass mobile non tender left breast  Abdominal:      General: Abdomen is flat. Bowel sounds are normal. There is no distension. Palpations: Abdomen is soft. There is no mass. Tenderness: There is no abdominal tenderness. There is no right CVA tenderness, left CVA tenderness or guarding. Musculoskeletal:         General: Normal range of motion. Cervical back: Normal range of motion and neck supple. Right lower leg: Edema (pitting edema) present. Left lower leg: Edema (pitting edema) present. Lymphadenopathy:      Cervical: No cervical adenopathy. Skin:     General: Skin is warm. Capillary Refill: Capillary refill takes less than 2 seconds. Neurological:      General: No focal deficit present. Mental Status: She is alert and oriented to person, place, and time. Gait: Gait abnormal (slow gait). Psychiatric:         Mood and Affect: Mood normal.         Behavior: Behavior normal.      Comments: She gets side tracked and appears to get confused at times when I ask her questions           Assessment:   Diagnosis Orders   1. Type 2 diabetes mellitus without complication, unspecified whether long term insulin use (HCC)  POCT Glucose   2. Dysuria  POCT Urinalysis no Micro   3. Localized edema     4. Cerebrovascular accident (CVA), unspecified mechanism ([facility]     5.  Left breast mass  CHASTITY KELY DIGITAL SCREEN BILATERAL    US BREAST COMPLETE LEFT         Plan:  Orders Placed This Encounter   Procedures    CHASTITY KELY DIGITAL SCREEN BILATERAL     Standing Status:   Future     Standing Expiration Date:   8/8/2022     Order Specific Question:   Reason for exam:     Answer:   Left breast mass    US BREAST COMPLETE LEFT     Standing Status:   Future     Standing Expiration Date:   8/8/2022     Order Specific Question:   Reason for exam:     Answer:   Left breast mass    POCT Glucose    POCT Urinalysis no Micro         Patient Instructions   I spoke with Dr. Solange Dotson, I explained that the pat has multiple issues chronic and acute going on but that I am not sure if I am even getting the correct story as she does get confused during the conversation. He says he will be happy to evaluate her in the ER. I explained that she also appears to have had a stroke of some sort as her speech is greatly impaired and that she never looked into it. After I was done with the phone call with Dr. Haylee Wise I went back in the room and found the pt sleeping in the chair. I was able to wake her up by starting a conversation. She slowly looked up at me. I ordered a mammogram and left breast ultrasound    Glucose today was 145 discussed with pt       Return for return to office after hospital er visit.      Rona Tavarez, DO

## 2022-07-08 NOTE — PATIENT INSTRUCTIONS
I spoke with Dr. Joaquin Bethea, I explained that the pat has multiple issues chronic and acute going on but that I am not sure if I am even getting the correct story as she does get confused during the conversation. He says he will be happy to evaluate her in the ER. I explained that she also appears to have had a stroke of some sort as her speech is greatly impaired and that she never looked into it. After I was done with the phone call with Dr. Joaquin Bethea I went back in the room and found the pt sleeping in the chair. I was able to wake her up by starting a conversation. She slowly looked up at me.      I ordered a mammogram and left breast ultrasound    Glucose today was 145 discussed with pt

## 2022-07-08 NOTE — ED PROVIDER NOTES
[facility]  eMERGENCY dEPARTMENT eNCOUnter      Pt Name: Annabel Pandya  MRN: [id number]  [place] 1945  Date of evaluation: 7/8/2022      CHIEF COMPLAINT       Chief Complaint   Patient presents with    Leg Swelling    Breast Mass     x 1 month per pt    Aphasia     x 1 year per pt         HISTORY OF PRESENT ILLNESS    Annabel Pandya is a 76 y.o. female who presents with chief complaint of bilateral leg swelling a breast mass and difficulty speaking for about a year. Patient states she lives alone since being  she has quite a few cats she made appointment to see Dr. Yarelis James she has not seen a physician in some time she came in with a leg swelling which has been going on for quite some time but getting worse getting worse and causing her pain and she is also noticed a mass in her left breast and is tender in the left side of her chest and is also had difficulty speaking she thinks she may have had a stroke about a year ago but did not seek medical care she is able to walk is becoming difficult with the swelling to her legs she denies any other weakness  She has a history of diabetes and hypertension    REVIEW OF SYSTEMS         Review of Systems   Constitutional: Negative for chills and fever. HENT: Negative for congestion and ear pain. Eyes: Negative for pain and visual disturbance. Respiratory: Negative for cough and shortness of breath. Cardiovascular: Positive for leg swelling. Negative for chest pain and palpitations. Gastrointestinal: Negative for abdominal pain, blood in stool, constipation, diarrhea, nausea and vomiting. Endocrine: Negative for polydipsia and polyuria. Genitourinary: Negative for difficulty urinating, dysuria and frequency. Musculoskeletal: Positive for gait problem. Negative for back pain, joint swelling, myalgias, neck pain and neck stiffness. Skin: Negative for rash. Neurological: Positive for speech difficulty.  Negative for dizziness, weakness and headaches. Hematological: Negative for adenopathy. Does not bruise/bleed easily. Psychiatric/Behavioral: Negative for confusion, self-injury and suicidal ideas. PAST MEDICAL HISTORY    has a past medical history of Acute cystitis, Cancer ([facility] [address] Chronic respiratory acidosis, Diabetes mellitus ([facility] [address] GERD (gastroesophageal reflux disease), Hypercapnia, Hyperlipidemia, Hypertension, Left leg cellulitis, and Obesity hypoventilation syndrome ([facility] [address] SURGICAL HISTORY      has a past surgical history that includes Cholecystectomy; Appendectomy; and Colon surgery. CURRENT MEDICATIONS       Current Discharge Medication List      CONTINUE these medications which have NOT CHANGED    Details   Multiple Vitamins-Minerals (THERAPEUTIC MULTIVITAMIN-MINERALS) tablet Take 1 tablet by mouth daily      melatonin 3 MG TABS tablet Take 3 mg by mouth nightly as needed      Garlic 7859 MG CAPS Take by mouth      Aspirin-Acetaminophen-Caffeine (EXCEDRIN EXTRA STRENGTH PO) Take by mouth             ALLERGIES     has No Known Allergies. FAMILY HISTORY     She indicated that the status of her mother is unknown. She indicated that the status of her father is unknown. She indicated that the status of her sister is unknown. She indicated that the status of her brother is unknown.     family history includes Alzheimer's Disease in her mother; Suma Snowden in her brother; Heart Failure in her father; Gregorio Palacios in her sister. SOCIAL HISTORY      reports that she has never smoked. She has never used smokeless tobacco. She reports that she does not drink alcohol and does not use drugs. PHYSICAL EXAM     INITIAL VITALS:  height is 5' 10\" (1.778 m) and weight is 195 lb 9.6 oz (88.7 kg). Her tympanic temperature is 97.4 °F (36.3 °C). Her blood pressure is 148/64 (abnormal) and her pulse is 69. Her respiration is 18 and oxygen saturation is 93%. Physical Exam  Constitutional:       Appearance: Normal appearance.  She is well-developed. HENT:      Head: Normocephalic and atraumatic. Right Ear: External ear normal.      Left Ear: External ear normal.   Eyes:      Conjunctiva/sclera: Conjunctivae normal.      Pupils: Pupils are equal, round, and reactive to light. Cardiovascular:      Rate and Rhythm: Normal rate and regular rhythm. Pulmonary:      Effort: Pulmonary effort is normal.      Breath sounds: Normal breath sounds. Abdominal:      General: Bowel sounds are normal.      Palpations: Abdomen is soft. Musculoskeletal:         General: Swelling and tenderness present. Cervical back: Normal range of motion. Right lower leg: Edema present. Left lower leg: Edema present. Comments: Both lower extremities are edematous erythematous there is some weeping   Skin:     General: Skin is warm and dry. Comments: She has multiple bug bites on her extremities   Neurological:      Mental Status: She is alert and oriented to person, place, and time. Comments:  This patient has dysarthric speech is somewhat thickened occasionally she does not find the right word though so she has a bit of expressive aphasia there is no other focal deficits seen   Psychiatric:         Behavior: Behavior normal.           DIFFERENTIAL DIAGNOSIS/ MDM:     Patient with a variety of medical complaints including peripheral edema red legs difficulty speaking and breast mass we will do a work-up    DIAGNOSTIC RESULTS     EKG: All EKG's are interpreted by the Emergency Department Physician who either signs or Co-signs this chart in the absence of a cardiologist.  Sinus arrhythmia with PACs rate of 76 bpm PA was 178 ms QRS duration is 98 ms QT corrected 425 ms axis is -45 she has some inverted T waves with some LVH there is no acute ST elevation or depression seen      RADIOLOGY:   I directly visualized the following  images and reviewed the radiologist interpretations:       EXAMINATION:   CT OF THE HEAD WITHOUT CONTRAST  7/8/2022 4:42 pm       TECHNIQUE:   CT of the head was performed without the administration of intravenous   contrast. Automated exposure control, iterative reconstruction, and/or weight   based adjustment of the mA/kV was utilized to reduce the radiation dose to as   low as reasonably achievable.       COMPARISON:   None.       HISTORY:   ORDERING SYSTEM PROVIDED HISTORY: Difficulty speaking for unknown length of   time   TECHNOLOGIST PROVIDED HISTORY:       Difficulty speaking for unknown length of time   Decision Support Exception - unselect if not a suspected or confirmed   emergency medical condition->Emergency Medical Condition (MA)   Reason for Exam: Difficulty speaking       FINDINGS:   BRAIN/VENTRICLES: There is no acute intracranial hemorrhage, mass effect or   midline shift.  No abnormal extra-axial fluid collection.  The gray-white   differentiation is maintained without evidence of an acute infarct.  There is   no evidence of hydrocephalus.  Mild involutional changes and chronic small   vessel ischemic disease.  Vascular calcifications.       ORBITS: The visualized portion of the orbits demonstrate no acute abnormality.       SINUSES: Mild ethmoid sinus mucosal thickening.  Mastoids are grossly clear.       SOFT TISSUES/SKULL:  No acute abnormality of the visualized skull or soft   tissues.           Impression   No acute intracranial abnormality.       Mild chronic microvascular change.            DUPLEX VENOUS ULTRASOUND OF THE BILATERAL LOWER EXTREMITIES7/8/2022 3:56 pm       TECHNIQUE:   Duplex ultrasound using B-mode/gray scaled imaging, Doppler spectral analysis   and color flow Doppler was obtained of the deep venous structures of the   lower bilateral extremities.       COMPARISON:   None.       HISTORY:   ORDERING SYSTEM PROVIDED HISTORY: Swelling   TECHNOLOGIST PROVIDED HISTORY:   Swelling   Reason for Exam: SWELLING, EDEMA       49-year-old female with left leg swelling and edema     FINDINGS:   Bilateral peroneal veins could not be visualized due to bilateral lower   extremity edema.       The remainder of the visualized veins of the bilateral lower extremities are   patent and free of echogenic thrombus. The veins demonstrate good   compressibility with normal color flow study and spectral analysis.         Impression   1. Peroneal veins could not be visualized due to bilateral lower extremity   edema. 2. Otherwise, no clear evidence for DVT in the bilateral lower extremities.            EXAMINATION:   CT OF THE CHEST WITH CONTRAST 7/8/2022 4:36 pm       TECHNIQUE:   CT of the chest was performed with the administration of intravenous   contrast. Multiplanar reformatted images are provided for review. Automated   exposure control, iterative reconstruction, and/or weight based adjustment of   the mA/kV was utilized to reduce the radiation dose to as low as reasonably   achievable.       COMPARISON:   None.       HISTORY:   ORDERING SYSTEM PROVIDED HISTORY: Left breast mass and chest wall tenderness   TECHNOLOGIST PROVIDED HISTORY:   Left breast mass and chest wall tenderness   Decision Support Exception - unselect if not a suspected or confirmed   emergency medical condition->Emergency Medical Condition (MA)   Reason for Exam: r/o breast mass       FINDINGS:   CT chest:       Lines and tubes:  None.       Lungs and Airways and Pleura:  Central airways are patent.  18 mm   ground-glass density is noted within the peripheral aspect of the right upper   lobe.  Linear ground-glass densities also noted within the left lung apex,   likely suggestive of atelectasis/scarring.  No lung consolidation. No pleural   effusion.  No pneumothorax.       Lymph nodes: No pathologically enlarged mediastinal, hilar, lower cervical,   or chest wall lymph nodes.       Cardiovascular and Mediastinum: No acute aortic pathology.  Cardiac chamber   sizes appear to measure within normal limits on this non ECG gated study. No   pericardial effusion. The thyroid gland is unremarkable.  The esophagus is   unremarkable.       Bones/Soft tissues: No fracture.  No definite suspicious lytic or blastic   foci.  Multiple foci of calcification are noted within the spleen, likely   related to prior granulomatous disease exposure.  4.3 x 3 cm mass lesion is   noted within left breast.       Visualized upper abdomen: Unremarkable.           Impression   18 mm ground-glass density is noted within the peripheral aspect of the right   upper lobe.  Finding may be infectious inflammatory or neoplastic in   etiology.  Follow-up examination in 3 months or PET study is recommended for   further evaluation.       No other acute pulmonary pathology.       4.3 x 3 cm mass within the left breast.  Mammographic  correlation is   recommended.                 ED BEDSIDE ULTRASOUND:       LABS:  Labs Reviewed   CBC WITH AUTO DIFFERENTIAL - Abnormal; Notable for the following components:       Result Value    Hemoglobin 15.7 (*)     MCHC 33.8 (*)     Eosinophils % 7 (*)     Absolute Eos # 0.58 (*)     All other components within normal limits   COMPREHENSIVE METABOLIC PANEL W/ REFLEX TO MG FOR LOW K - Abnormal; Notable for the following components:    Glucose 113 (*)     BUN 32 (*)     CREATININE 1.08 (*)     Bun/Cre Ratio 30 (*)     Calcium 11.4 (*)     Anion Gap 8 (*)     GFR Non- 49 (*)     GFR  59 (*)     All other components within normal limits   BRAIN NATRIURETIC PEPTIDE - Abnormal; Notable for the following components:    Pro- (*)     All other components within normal limits   URINALYSIS WITH REFLEX TO CULTURE - Abnormal; Notable for the following components:    pH, UA 7.0 (*)     Leukocyte Esterase, Urine 1+ (*)     All other components within normal limits   MICROSCOPIC URINALYSIS - Abnormal; Notable for the following components:    Bacteria, UA TRACE (*)     All other components within normal limits   BASIC METABOLIC PANEL W/ REFLEX TO MG FOR LOW K - Abnormal; Notable for the following components:    Glucose 111 (*)     BUN 26 (*)     CREATININE 1.14 (*)     Bun/Cre Ratio 23 (*)     GFR Non- 46 (*)     GFR  56 (*)     All other components within normal limits   CBC WITH AUTO DIFFERENTIAL - Abnormal; Notable for the following components:    MCHC 33.7 (*)     Eosinophils % 8 (*)     Absolute Eos # 0.70 (*)     All other components within normal limits   LIPID PANEL - Abnormal; Notable for the following components:    Triglycerides 161 (*)     All other components within normal limits   HEMOGLOBIN A1C - Abnormal; Notable for the following components:    Hemoglobin A1C 6.1 (*)     All other components within normal limits   CBC WITH AUTO DIFFERENTIAL - Abnormal; Notable for the following components:    Eosinophils % 8 (*)     Absolute Eos # 0.70 (*)     All other components within normal limits   BASIC METABOLIC PANEL - Abnormal; Notable for the following components:    Glucose 104 (*)     CREATININE 1.02 (*)     GFR Non- 52 (*)     All other components within normal limits   CBC WITH AUTO DIFFERENTIAL - Abnormal; Notable for the following components:    Eosinophils % 7 (*)     Absolute Eos # 0.58 (*)     All other components within normal limits   BASIC METABOLIC PANEL - Abnormal; Notable for the following components:    CREATININE 0.95 (*)     GFR Non- 57 (*)     All other components within normal limits   CBC WITH AUTO DIFFERENTIAL - Abnormal; Notable for the following components:    Eosinophils % 8 (*)     Absolute Eos # 0.62 (*)     All other components within normal limits   BASIC METABOLIC PANEL - Abnormal; Notable for the following components:    CREATININE 0.94 (*)     Bun/Cre Ratio 21 (*)     Anion Gap 7 (*)     GFR Non- 57 (*)     All other components within normal limits   COVID-19, RAPID   CULTURE, BLOOD 1   CULTURE, BLOOD 1 TROPONIN   LACTATE, SEPSIS   APTT   SPECIMEN REJECTION   TSH   IMMATURE PLATELET FRACTION           EMERGENCY DEPARTMENT COURSE:   Vitals:    Vitals:    07/11/22 2230 07/12/22 0249 07/12/22 0600 07/12/22 0629   BP:  132/63  (!) 148/64   Pulse:  69     Resp: 18 18  18   Temp: 97.6 °F (36.4 °C) 97.4 °F (36.3 °C)  97.4 °F (36.3 °C)   TempSrc: Oral Tympanic  Tympanic   SpO2: 97% 95%  93%   Weight:   195 lb 9.6 oz (88.7 kg)    Height:         -------------------------  BP: (!) 148/64, Temp: 97.4 °F (36.3 °C), Heart Rate: 69, Resp: 18      Re-evaluation Notes        CRITICAL CARE:   IP CONSULT TO HOSPITALIST        CONSULTS:      PROCEDURES:  None    FINAL IMPRESSION      1. Cellulitis of lower extremity, unspecified laterality    2. Bilateral leg edema    3. Breast mass, left          DISPOSITION/PLAN   DISPOSITION admitted    Condition on Disposition    Stable    PATIENT REFERRED TO:  No follow-up provider specified. DISCHARGE MEDICATIONS:  Current Discharge Medication List          (Please note that portions of this note were completed with a voice recognition program.  Efforts were made to edit the dictations but occasionally words are mis-transcribed.)    Nii Castillo MD,, MD, F.A.A.E.M.   Attending Emergency Physician                          Nii Castillo MD  07/12/22 [id number]

## 2022-07-09 NOTE — H&P
HOSPITALIST ADMISSION H&P      REASON FOR ADMISSION:  Bilateral leg cellulitis  ESTIMATED LENGTH OF STAY:>2 midnights, 3-4 days    ATTENDING/ADMITTING PHYSICIAN: Abraham Marcus MD  PCP: Mary Zuniga DO    HISTORY OF PRESENT ILLNESS:      The patient is a 76 y.o. female patient of Mary Zuniga DO who presents from the ER. She established care with a new PCP, Dr. Garcia Chavez, today and was sent to the ER for bilateral leg cellulitis. She has chronic bilateral leg swelling since the 1970's, but she c/o recent increase in swelling, redness, warmth, and pain in both lower legs -- she is unable to tell when these new symptoms developed. She lives alone and has a lot of cats. Some history is obtained from EMR including outside records, family report, and staff report. She denies fevers, chest pain, cough, or increased shortness of breath. She does report some mild dysuria, but denies hematuria. She denies abdominal pain, nausea, vomiting, or diarrhea. She does tend to get constipated at times. Per report, her  is residing at [facility].    In ER, WBC 8.9, afebrile, lactic acid 0.8. Ultrasound of bilateral lower extremities were negative for DVT. Head CT was negative for acute process. EKG showed sinus rhythm with PACs and right BBB. Troponin 11. The patient told Dr. Garcia Chavez that she's had a non-tender golf ball size mass in her left lateral breast for the past 2 months -- this will be further imaged in the outpatient setting (already ordered). However in ER, CT of the chest impression: 18 mm ground-glass density is noted within the peripheral aspect of the right upper lobe. No other acute pulmonary pathology.  4.3 x 3 cm mass within the left breast.     ProBNP 401 -- last 2D echo 08/2018 showed EF 60%, grade 1DD -- patient declined a stress test 08/31/2018 despite having had episodes of chest pain    The patient feels she had a stroke at home about 6 months ago -- she was never evaluated for this and describes sudden musculoskeletal, hematologic, lymphatic, allergic/immunologic, neurologic, psychiatric, or skin. Code status: patient wishes for Full Code at this time. PHYSICAL EXAM:  Vitals:  BP (!) 121/49   Pulse 66   Temp 97.7 °F (36.5 °C)   Resp 16   Wt 200 lb (90.7 kg)   SpO2 96%   BMI 34.98 kg/m²     General: awake, alert and cooperative, pleasant  HEENT: EMOI, External nose normal, Normocephalic, Atraumatic and Neck with full ROM  Neck: Supple and Carotid Pulses Present  Chest/Lungs: Clear to Auscultation without Rales, Rhonchi, or Wheezes and Respirations even and unlabored -- non-tender left breast mass palpable at the 2-3 o'clock position without overlying skin changes or nipple discharge  Cardiac: Regular Rate and Rhythm  GI/Abdomen:  Bowel Sounds Present and Soft, Non-tender, without Guarding or Rebound Tenderness  : adult brief in place and Not examined  Extremities/Musculoskeletal: BLE with edema 3+ bubble wrap appearance, dorsal feet with chronic 3-4+ pitting edema  Skin: BLE with erythema, edema, and excessive warmth present from above the ankle to mid-shin, No Cyanosis and Skin warm and dry  Neuro: Alert and Oriented, to Person, to Time, to Place, to Situation and No Localizing Signs/Symptoms -- expressive aphasia present, with pressured speech  Psychiatric: Normal mood and affect      LABS:    CBC with Differential:    Lab Results   Component Value Date/Time    WBC 8.9 07/08/2022 03:43 PM    RBC 4.84 07/08/2022 03:43 PM    HGB 15.7 07/08/2022 03:43 PM    HCT 46.4 07/08/2022 03:43 PM     07/08/2022 03:43 PM    MCV 95.9 07/08/2022 03:43 PM    MCH 32.4 07/08/2022 03:43 PM    MCHC 33.8 07/08/2022 03:43 PM    RDW 12.0 07/08/2022 03:43 PM    LYMPHOPCT 34 07/08/2022 03:43 PM    MONOPCT 7 07/08/2022 03:43 PM    BASOPCT 1 07/08/2022 03:43 PM    MONOSABS 0.65 07/08/2022 03:43 PM    LYMPHSABS 3.01 07/08/2022 03:43 PM    EOSABS 0.58 07/08/2022 03:43 PM    BASOSABS 0.07 07/08/2022 03:43 PM     CMP:    Lab Results   Component Value Date/Time     07/08/2022 03:43 PM    K 4.2 07/08/2022 03:43 PM     07/08/2022 03:43 PM    CO2 29 07/08/2022 03:43 PM    BUN 32 07/08/2022 03:43 PM    CREATININE 1.08 07/08/2022 03:43 PM    GFRAA 59 07/08/2022 03:43 PM    LABGLOM 49 07/08/2022 03:43 PM    GLUCOSE 113 07/08/2022 03:43 PM    PROT 7.3 07/08/2022 03:43 PM    LABALBU 4.0 07/08/2022 03:43 PM    CALCIUM 11.4 07/08/2022 03:43 PM    BILITOT 0.33 07/08/2022 03:43 PM    ALKPHOS 99 07/08/2022 03:43 PM    AST 16 07/08/2022 03:43 PM    ALT 15 07/08/2022 03:43 PM       ASSESSMENT:      Patient Active Problem List   Diagnosis    AMMON (obstructive sleep apnea)    Hypertension    Type 2 diabetes mellitus without complication (HCC)    Class 1 obesity due to excess calories with serious comorbidity and body mass index (BMI) of 34.0 to 34.9 in adult    Insomnia    Frequent headaches    Leg swelling    Stroke-like symptoms    Impaired gait    Left breast mass    Right upper lobe pulmonary nodule    Expressive aphasia    Bilateral lower leg cellulitis       PLAN:    1. Bilateral leg cellulitis -- IV Ancef, elevate legs, pain control prn  2. Dehydration -- IV hydration, monitor I&O and renal function  3. Hypertension -- cardiac diet, monitor and treat if needed  4. DMII -- carb controlled diet, HgbA1C pending  5. AMMON -- will have RT try autopap tonight to see if patient agreeable/tolerates -- patient is noncompliant at home and has not had cpap titration study but had positive sleep study in 2018  6. TSH and lipid panel pending  7. Chronic stroke like symptoms -- telemetry monitoring -- PT/OT/SLP eval and treat -- nursing to perform bedside swallow eval -- daily aspirin -- fall precautions   8. RT protocols  9. Home medications reviewed  10. DVT prophylaxis -- lovenox  11. Will require outpatient follow up for left breast mass, RUL lung density, and chronic expressive aphasia  12.  See orders     Note that over 50 minutes was spent in reviewing and obtaining history, physical examination and evaluation of the patient, placing orders, providing education, coordinating care, reviewing test results, documenting in the medical record, and discussion/communicating with patient/caregiver/family.     KAREN Rivera - CNP, FNP-BC  8:14 PM  7/8/2022

## 2022-07-09 NOTE — PROGRESS NOTES
TRUMAN Chavez assisted pt with bathing and oral care/hair care. A warmed shower cap was used, and it was noted that the pt's hair was significantly tangled around a rubber band ponytail malcolm. Ysabel Avelar attempted to use a detangler and brush but pt refused several times stating that she prefers her hair as it is and to not mess it up. COLT [place]

## 2022-07-09 NOTE — PLAN OF CARE
Problem: Discharge Planning  Goal: Discharge to home or other facility with appropriate resources  Outcome: Progressing  Flowsheets (Taken 7/8/2022 2047)  Discharge to home or other facility with appropriate resources:   Identify barriers to discharge with patient and caregiver   Identify discharge learning needs (meds, wound care, etc)   Arrange for needed discharge resources and transportation as appropriate     Problem: Safety - Adult  Goal: Free from fall injury  Outcome: Progressing     Problem: ABCDS Injury Assessment  Goal: Absence of physical injury  Outcome: Progressing     Problem: Skin/Tissue Integrity  Goal: Absence of new skin breakdown  Description: 1. Monitor for areas of redness and/or skin breakdown  2. Assess vascular access sites hourly  3. Every 4-6 hours minimum:  Change oxygen saturation probe site  4. Every 4-6 hours:  If on nasal continuous positive airway pressure, respiratory therapy assess nares and determine need for appliance change or resting period.   Outcome: Progressing

## 2022-07-09 NOTE — PROGRESS NOTES
symmetrical, trachea midline and thyroid not enlarged, symmetric, no tenderness/mass/nodules  Lungs: clear to auscultation bilaterally  Heart: regular rate and rhythm, S1, S2 normal, no murmur, click, rub or gallop  Abdomen: soft, non-tender; bowel sounds normal; no masses,  no organomegaly  Extremities: edema 2+ edema -- erythema  Neurologic: Mental status: Alert, oriented, thought content appropriate    Assessment and Plan:         Bilateral lower leg cellulitis  Significantly improved-- no change rx    Left breast mass  Plan: outpr f/u    Right upper lobe pulmonary nodule  Plan: outpt f/u    Expressive aphasia--she reports up to 3 yrs present   on ASA-- lipids and HA1C pending, BP OK-- needs carotid US and echo--no clear if CVA or other neuro process    Difficult social situation at home with no running water and 17 cats- friend brings food daily. She rarely leaves. PT concern for safety ambulating     ?  AMMON- declines      Jackie Domínguez MD, MD  Rounding Hospitalist

## 2022-07-09 NOTE — PROGRESS NOTES
Attempted autopap with patient. Patient could not tolerate. Patient states too much air. Patient didn't want to wear autopap.

## 2022-07-09 NOTE — PROGRESS NOTES
Physical Therapy  Facility/Department: [facility]  Physical Therapy Initial Assessment    Name: Eliud Echavarria  : 1945  MRN: [id number]  Date of Service: 2022    Discharge Recommendations:  [address] [facility] with PT,Continue to assess pending progress          Patient Diagnosis(es): The primary encounter diagnosis was Cellulitis of lower extremity, unspecified laterality. Diagnoses of Bilateral leg edema and Breast mass, left were also pertinent to this visit. Past Medical History:  has a past medical history of Acute cystitis, Cancer ([facility] [address] Chronic respiratory acidosis, Diabetes mellitus ([facility] [address] GERD (gastroesophageal reflux disease), Hypercapnia, Hyperlipidemia, Hypertension, Left leg cellulitis, and Obesity hypoventilation syndrome ([facility] [address] Past Surgical History:  has a past surgical history that includes Cholecystectomy; Appendectomy; and Colon surgery. Assessment   Body Structures, Functions, Activity Limitations Requiring Skilled Therapeutic Intervention: Decreased functional mobility ; Decreased ADL status; Decreased strength;Decreased balance  Therapy Prognosis: Good  Decision Making: Low Complexity  Activity Tolerance  Activity Tolerance: Patient tolerated treatment well     Plan   Plan  Plan: 1 time a day 7 days a week  Current Treatment Recommendations: Strengthening,Balance training,Functional mobility training,Transfer training,ADL/Self-care training,Gait training,Neuromuscular re-education,Home exercise program,Safety education & training,Patient/Caregiver education & training,Therapeutic activities  Safety Devices  Type of Devices: Left in chair,Nurse notified,Gait belt,Chair alarm in place     Restrictions        Subjective   General  Chart Reviewed: Yes  Patient assessed for rehabilitation services?: Yes  Family / Caregiver Present: No  Referring Practitioner: KAREN Duncan CNP  Diagnosis: Generalized weakness  Follows Commands: Within Functional Limits  General Comment  Comments: Patient has expressive aphasia. Subjective  Subjective: Patient agreeable to PT constantine.         Social/Functional History  Social/Functional History  Lives With: Alone  Type of Home: House  Home Layout: Two level,Performs ADL's on one level (Patient reports that she is sleeping in the recliner at home)  Bathroom Shower/Tub:  (Patient states that she utilizes sponge baths at home. Patient reports that she does not use her bathroom due to not having running water at home.)  Bathroom Toilet: Standard  Home Equipment: Cane,Walker, [address] Help From: [employer])  ADL Assistance: Independent  Homemaking Assistance: Independent  Homemaking Responsibilities: Yes  Shopping Responsibility: Secondary (Patient reports that her friends brings groceries/meals to her.)  Ambulation Assistance: Independent  Transfer Assistance: Independent  Active : No  Patient's  Info: Friends  Occupation: Retired  Additional Comments: Patient reports that her home does not have running water. States that her friends brings her gallons of water that she uses for her water supply. Vision/Hearing  Vision  Vision: Within Functional Limits  Hearing  Hearing: Within functional limits    Cognition   Orientation  Overall Orientation Status: Within Functional Limits  Cognition  Cognition Comment: Patient demonstrates expressive aphasia.      Objective   Heart Rate: 68  Heart Rate Source: Monitor  BP: (!) 121/50  BP Location: Left upper arm  BP Method: Automatic  Patient Position: Semi fowlers  MAP (Calculated): 73.67  Resp: 14  SpO2: 96 %  O2 Device: None (Room air)              AROM RLE (degrees)  RLE AROM: WFL  AROM LLE (degrees)  LLE AROM : WFL  Strength RLE  Comment: Grossly 4- to 4/5  Strength LLE  Comment: Grossly 4- to 4/5              Transfers  Sit to Stand: Contact guard assistance  Stand to sit: Contact guard assistance  Ambulation  Surface: level tile  Device: No Device  Assistance: Contact guard assistance  Gait Deviations: Slow Leyda;Decreased step length  Distance: 15'     Balance  Sitting - Static: Good  Sitting - Dynamic: Good  Standing - Static: Fair;+  Standing - Dynamic: Fair            Goals  Short Term Goals  Time Frame for Short term goals: Length of stay  Short term goal 1: Patient will complete bed mobility MOD I  Short term goal 2: Patient will complete transfers MOD I  Short term goal 3: Patient will ambulate 21' with MOD I/supervision       Therapy Time   Individual Concurrent Group Co-treatment   Time In 1010         Time Out 1025         Minutes [address], PT

## 2022-07-10 NOTE — CARE COORDINATION
Pt states her  is at [address] [facility] in [place] and she would like to go there if possible. Pt states there has been issues in the past with her husbands daughter that has prevented her from visiting him. elaine notified of the patient's desire and will look at her information on Monday July 11th as the patient is not ready for discharge today. Pt updated and agreeable.

## 2022-07-10 NOTE — PROGRESS NOTES
Hospitalist Progress Note  7/10/2022 11:51 AM  Subjective:   Admit Date: 7/8/2022  PCP: Kai Krueger DO  Interval History: She feels legs are better. Diet: ADULT DIET; Regular; 4 carb choices (60 gm/meal); Low Fat/Low Chol/High Fiber/DREA  Medications:   Scheduled Meds:   ceFAZolin  1,000 mg IntraVENous Q8H    sodium chloride flush  5-40 mL IntraVENous 2 times per day    enoxaparin  40 mg SubCUTAneous Daily    aspirin  81 mg Oral Daily    docusate sodium  100 mg Oral BID     Continuous Infusions:   sodium chloride      sodium chloride 75 mL/hr at 07/09/22 1819     CBC:   Recent Labs     07/08/22  1543 07/09/22  0447 07/10/22  0544   WBC 8.9 8.5 9.4   HGB 15.7* 13.8 14.4    See Reflexed IPF Result 209     BMP:    Recent Labs     07/08/22  1543 07/09/22  0447 07/10/22  0544    141 140   K 4.2 3.9 4.2    106 107   CO2 29 25 23   BUN 32* 26* 18   CREATININE 1.08* 1.14* 1.02*   GLUCOSE 113* 111* 104*     Hepatic:   Recent Labs     07/08/22  1543   AST 16   ALT 15   BILITOT 0.33   ALKPHOS 99     Troponin: No results for input(s): TROPONINI in the last 72 hours. BNP: No results for input(s): BNP in the last 72 hours. Lipids:   Recent Labs     07/09/22  0447   CHOL 187   HDL 41     INR: No results for input(s): INR in the last 72 hours. Objective:   Vitals: BP (!) 128/55   Pulse 54   Temp 97.8 °F (36.6 °C) (Tympanic)   Resp 16   Ht 5' 10\" (1.778 m)   Wt 196 lb 11.2 oz (89.2 kg)   SpO2 96%   BMI 28.22 kg/m²     Intake/Output Summary (Last 24 hours) at 7/10/2022 1151  Last data filed at 7/9/2022 1819  Gross per 24 hour   Intake 1506.43 ml   Output --   Net 1506.43 ml     Patient Vitals for the past 96 hrs (Last 3 readings):   Weight   07/10/22 0600 196 lb 11.2 oz (89.2 kg)   07/08/22 2037 194 lb 9.6 oz (88.3 kg)   07/08/22 1444 200 lb (90.7 kg)     General appearance: alert and cooperative with exam  HEENT: Head: Normocephalic, no lesions, without obvious abnormality.   Neck: no

## 2022-07-10 NOTE — PROGRESS NOTES
This RN was discussing options after discharge, including ECF/rehab placement. Pt states her  is at [facility] but that she isn't allowed there because 'his daughter won't let me see him. They'll call the \". This RN asked for her 's name but pt unwilling to share it, stating again that \"she doesn't want me to see him'. This RN again asked just for his first name-pt became upset and refused to say his name. It is noted that a daughter, Malika Kaur is listed as emergency contact, but she is actually the pt's 's daughter, and the phone number listed for her is the same as the pt's home phone number.  Dirk Pickard RN

## 2022-07-10 NOTE — PROGRESS NOTES
Physical Therapy  Facility/Department: [facility]  PROGRESSIVE CARE  Daily Treatment Note  NAME: Ronald Bhatt  : 1945  MRN: [id number]    Date of Service: 7/10/2022    Discharge Recommendations:  [address] with PT,Continue to assess pending progress        Patient Diagnosis(es): The primary encounter diagnosis was Cellulitis of lower extremity, unspecified laterality. Diagnoses of Bilateral leg edema and Breast mass, left were also pertinent to this visit. Assessment   Activity Tolerance: Patient tolerated treatment well     Plan    Plan  Plan: 1 time a day 7 days a week  Current Treatment Recommendations: Strengthening;Balance training;Functional mobility training;Transfer training;ADL/Self-care training;Gait training;Neuromuscular re-education;Home exercise program;Safety education & training;Patient/Caregiver education & training; Therapeutic activities     Restrictions        Subjective    Subjective  Subjective: Pt. in bed at initiation of session and agreeable to therapy at this time. Pain: Pt. notes no pain at initiation of session. Orientation  Overall Orientation Status: Within Functional Limits  Cognition  Cognition Comment: Patient demonstrates expressive aphasia.      Objective   Vitals     Bed Mobility Training  Bed Mobility Training: Yes  Supine to Sit: Stand-by assistance  Scooting: Stand-by assistance  Transfer Training  Transfer Training: Yes  Sit to Stand: Stand-by assistance;Contact-guard assistance  Stand to Sit: Stand-by assistance;Contact-guard assistance  Gait Training  Gait Training: Yes  Gait  Overall Level of Assistance: Stand-by assistance;Contact-guard assistance  Step Length: Right shortened;Left shortened  Swing Pattern: Left symmetrical;Right symmetrical  Gait Abnormalities: Decreased step clearance  Distance (ft): 200 Feet (Patient returned to bedside chair at conclusion of session)  Assistive Device: Gait belt  Rail Use: Right (Intermittently.)  Neuromuscular Education  NDT Treatment: Gait ;Sitting;Standing  PT Exercises  Exercise Treatment: Supine ankle pumps, quad sets, hip abd, heel slides x10 ea     Safety Devices  Type of Devices: Left in chair;Nurse notified;Gait belt; Chair alarm in place       Goals  Short Term Goals  Time Frame for Short term goals: Length of stay  Short term goal 1: Patient will complete bed mobility MOD I  Short term goal 2: Patient will complete transfers MOD I  Short term goal 3: Patient will ambulate 21' with MOD I/supervision    Education  Patient Education  Education Given To: Patient  Education Provided: Transfer Training  Education Method: Verbal  Education Outcome: Verbalized understanding    Therapy Time   Individual Concurrent Group Co-treatment   Time In 0732        Time Out 0169        Minutes [address], [facility]

## 2022-07-11 PROBLEM — L03.119 CELLULITIS OF LOWER EXTREMITY: Status: ACTIVE | Noted: 2022-01-01

## 2022-07-11 NOTE — FLOWSHEET NOTE
rounding in PCU. Assessment: Patient was alert and sitting in her chair. Patient lives alone ( has Alzheimer's disease and lives in nursing care), has the support of her adult daughter, 4 grandchildren, a close friend and has 23 cats (one in the home). Patient's only experience with cellulitis was in 1970, feels competent to care for herself and does not want to move into nursing care. Patient was formerly    07/11/22 1541   Encounter Summary   Encounter Overview/Reason  Initial Encounter   Service Provided For: Patient   Referral/Consult From: [address] [place] [address] Children;Friends/neighbors   Last Encounter  07/11/22   Complexity of Encounter Low   Begin Time 1240   End Time  1307   Total Time Calculated 27 min   Spiritual/Emotional needs   Type Spiritual Support   Assessment/Intervention/Outcome   Assessment Calm; Hopeful   Intervention Active listening;Prayer (assurance of)/[place]   Outcome Acceptance;Encouraged;Engaged in conversation;Expressed feelings, needs, and concerns;Expressed Gratitude; Optimistic;Receptive   Plan and Referrals   Plan/Referrals Continue to visit, (comment); Other (Comment)  (Requested material on [employer] in [place])   active in the [address] [place] [address] and requested information on [employer] in [place]. Intervention: Engaged in conversation and active listening. Prayed with Patient. Outcome: Patient expressed appreciation for visit and offer of continued prayer. Plan: Chaplains are available on site or on call 24/7 for spiritual and emotional support.     Electronically signed by Chaparrita Harper on 7/11/2022 at 3:44 PM

## 2022-07-11 NOTE — PROGRESS NOTES
Speech Language Pathology  Facility/Department: [facility]  PROGRESSIVE CARE  Initial Speech/Language/Cognitive Assessment    NAME: Eliud Echavarria  : 1945   MRN: [id number]  ADMISSION DATE: 2022  ADMITTING DIAGNOSIS: bilateral leg cellulitis      Medical history:  Patient is a 77 y/o female who presented to ED on 22 with bilateral leg cellulitis and was admitted to hospital.  She was noted to have some garbled speech at times and expressive aphasia. Patient poor historian and notes this has occurred anywhere from 6 months-3 years ago. Question prior untreated CVA. Past Medical History:   Diagnosis Date    Acute cystitis 2021    Cancer [facility])     colon    Chronic respiratory acidosis 2018    Diabetes mellitus ([facility] [address]     GERD (gastroesophageal reflux disease)     Hypercapnia     Hyperlipidemia     Hypertension     Left leg cellulitis 2021    Obesity hypoventilation syndrome ([facility] [address] 2018       Date of Eval: 2022   Evaluating Therapist: Luise Mohs, SLP    RECENT RESULTS  CT OF HEAD/MRI:  Completed 22:    No acute intracranial abnormality.       Mild chronic microvascular change.             Primary Complaint:  Nursing notes speech difficulty, expressive aphasia    Pain:  Pain Assessment  Pain Assessment: None - Denies Pain    Assessment:  Cognitive Diagnosis: Severe cognitive impairment  Speech Diagnosis: Mild to moderate dysarthria of speech        Recommendations:  Requires SLP Intervention: Yes  Referral To: Neurology d/t poor cognition in absence of any current diagnosis contributing to change. Recommend discharge to SNF d/t poor cognition, decreased safety awareness. Plan:    ST 1x/week x 5 days/week while in acute care    Goals:  1. Patient will use strategies to produce 3-syllable words in 8/10 trials. 2.  Patient will complete open-ended sentences 4/5 trials. 3.  Patient will complete sustained attention tasks with 80% accuracy.       Patient/family

## 2022-07-11 NOTE — PROGRESS NOTES
Physical Therapy  Facility/Department: [facility]  PROGRESSIVE CARE  Daily Treatment Note  NAME: Lei Luong  : 1945  MRN: [id number]    Date of Service: 2022    Discharge Recommendations:  [address] with PT,Continue to assess pending progress        Patient Diagnosis(es): The primary encounter diagnosis was Cellulitis of lower extremity, unspecified laterality. Diagnoses of Bilateral leg edema and Breast mass, left were also pertinent to this visit. Assessment   Activity Tolerance: Patient tolerated treatment well     Plan    Plan  Current Treatment Recommendations: Strengthening;Balance training;Functional mobility training;Transfer training;ADL/Self-care training;Gait training;Neuromuscular re-education;Home exercise program;Safety education & training;Patient/Caregiver education & training; Therapeutic activities     Restrictions        Subjective    Subjective  Subjective: Patient in bedside chair upon arrival, agreeable to session. No pain rating this date. Orientation  Overall Orientation Status: Within Functional Limits  Cognition  Cognition Comment: Patient demonstrates expressive aphasia. Objective   Vitals  O2 Device: None (Room air)  Bed Mobility Training  Bed Mobility Training: No  Transfer Training  Transfer Training: Yes  Overall Level of Assistance: Supervision  Sit to Stand: Supervision  Stand to Sit: Supervision  Stand Pivot Transfers: Supervision  Gait Training  Gait Training: Yes  Gait  Overall Level of Assistance: Contact-guard assistance  Base of Support: Narrowed; Shift to left  Speed/Leyda: Shuffled  Step Length: Right shortened;Left shortened  Gait Abnormalities: Decreased step clearance; Path deviations; Shuffling gait  Distance (ft): 200 Feet  Assistive Device: Gait belt     PT Exercises  A/AROM Exercises: Marches x10, LAQ x10, HR/TR x10, hip abduction 10x, gluteal squeezes x10     Safety Devices  Type of Devices: Left in chair;Nurse notified;Gait belt;Call light within reach       Goals  Short Term Goals  Time Frame for Short term goals: Length of stay  Short term goal 1: Patient will complete bed mobility MOD I  Short term goal 2: Patient will complete transfers MOD I  Short term goal 3: Patient will ambulate 21' with MOD I/supervision    Education  Patient Education  Education Given To: Patient  Education Provided: Role of Therapy  Education Method: Verbal    Therapy Time   Individual Concurrent Group Co-treatment   Time In 0222         Time Out 0235         Minutes [address] [place] [address]

## 2022-07-11 NOTE — PLAN OF CARE
cellulitis  8/[id number]/[id number] by Beck Silver RN  Outcome: Progressing     Problem: Chronic Conditions and Co-morbidities  Goal: Patient's chronic conditions and co-morbidity symptoms are monitored and maintained or improved  7/11/2022 1117 by Bulmaro Lozano RN  Outcome: Progressing  Flowsheets (Taken 7/11/2022 0928)  Care Plan - Patient's Chronic Conditions and Co-Morbidity Symptoms are Monitored and Maintained or Improved:   Monitor and assess patient's chronic conditions and comorbid symptoms for stability, deterioration, or improvement   Collaborate with multidisciplinary team to address chronic and comorbid conditions and prevent exacerbation or deterioration   Update acute care plan with appropriate goals if chronic or comorbid symptoms are exacerbated and prevent overall improvement and discharge  4/69/2588 [id number] by Beck Silver RN  Outcome: Progressing

## 2022-07-11 NOTE — PROGRESS NOTES
Hospitalist Progress Note    Patient:  Iain Berger     YOB: 1945    MRN: [id number]   Admit date: 7/8/2022     Acct: [de-identified]     PCP: Miguelito Katz DO    CC--Interval History:   BLE edema---cellulitis---on Ancef IV---UNNA boots being placed    Dehydration---improved    Living situation---alone--no running water--relies on friends for food--multiple > 15 cats by report    Left breast mass---likely malignancy----outpatient follow up    Cerebrovascular disease--likely CVA---2021--dysarthria---expressive aphasia--see speech evaluation    DM2---BG = 89    HTN---153/53    See note below     All other ROS negative except noted in HPI    Diet:  ADULT DIET; Regular; 4 carb choices (60 gm/meal);  Low Fat/Low Chol/High Fiber/DREA    Medications:  Scheduled Meds:   ceFAZolin  1,000 mg IntraVENous Q8H    sodium chloride flush  5-40 mL IntraVENous 2 times per day    enoxaparin  40 mg SubCUTAneous Daily    aspirin  81 mg Oral Daily    docusate sodium  100 mg Oral BID     Continuous Infusions:   sodium chloride Stopped (07/11/22 1135)     PRN Meds:melatonin, sodium chloride flush, sodium chloride, ondansetron **OR** ondansetron, polyethylene glycol, acetaminophen **OR** acetaminophen, albuterol, sodium chloride nebulizer, albuterol    Objective:  Labs:  CBC with Differential:    Lab Results   Component Value Date/Time    WBC 8.0 07/11/2022 04:30 AM    RBC 4.67 07/11/2022 04:30 AM    HGB 14.9 07/11/2022 04:30 AM    HCT 44.8 07/11/2022 04:30 AM     07/11/2022 04:30 AM    MCV 95.9 07/11/2022 04:30 AM    MCH 31.9 07/11/2022 04:30 AM    MCHC 33.3 07/11/2022 04:30 AM    RDW 11.9 07/11/2022 04:30 AM    LYMPHOPCT 34 07/11/2022 04:30 AM    MONOPCT 9 07/11/2022 04:30 AM    BASOPCT 1 07/11/2022 04:30 AM    MONOSABS 0.70 07/11/2022 04:30 AM    LYMPHSABS 2.73 07/11/2022 04:30 AM    EOSABS 0.58 07/11/2022 04:30 AM    BASOSABS 0.05 07/11/2022 04:30 AM     BMP:    Lab Results   Component Value Date/Time     07/11/2022 04:30 AM    K 4.2 07/11/2022 04:30 AM     07/11/2022 04:30 AM    CO2 23 07/11/2022 04:30 AM    BUN 17 07/11/2022 04:30 AM    LABALBU 4.0 07/08/2022 03:43 PM    CREATININE 0.95 07/11/2022 04:30 AM    CALCIUM 9.7 07/11/2022 04:30 AM    GFRAA >60 07/11/2022 04:30 AM    LABGLOM 57 07/11/2022 04:30 AM    GLUCOSE 89 07/11/2022 04:30 AM           Physical Exam:  Vitals: BP (!) 124/56   Pulse 58   Temp 97.6 °F (36.4 °C) (Tympanic)   Resp 18   Ht 5' 10\" (1.778 m)   Wt 195 lb 11.2 oz (88.8 kg)   SpO2 97%   BMI 28.08 kg/m²   24 hour intake/output:    Intake/Output Summary (Last 24 hours) at 7/11/2022 1146  Last data filed at 7/11/2022 1136  Gross per 24 hour   Intake 1451.65 ml   Output --   Net 1451.65 ml     Last 3 weights: Wt Readings from Last 3 Encounters:   07/11/22 195 lb 11.2 oz (88.8 kg)   07/08/22 195 lb (88.5 kg)   08/14/18 218 lb (98.9 kg)     HEENT: glasses---, Normocephalic and Atraumatic  Neck: Supple, No Masses, Tenderness, Nodularity and No Lymphadenopathy  Chest/Lungs: Clear to Auscultation without Rales, Rhonchi, or Wheezes and Distant Breath Sounds  Cardiac: Regular Rate and Rhythm  GI/Abdomen: Bowel Sounds Present and Soft, Non-tender, without Guarding or Rebound Tenderness  : Not examined  EXT/Skin: red--bubble wrap appearance--decreased tenderness--minimal drainage, No Cyanosis, No Clubbing and Edema Present  Neuro: alert--hearing impairment---gait-balance instability--shuffling gait---dementia?--- and No Localizing Signs/Symptoms      Assessment:    Principal Problem:    Bilateral lower leg cellulitis  Active Problems:    Left breast mass    Right upper lobe pulmonary nodule    Expressive aphasia    Dehydration  Resolved Problems:    * No resolved hospital problems. SASKIA Lugo  77  WF   Wilman Fields, MASSIMO---[place];  [facility] speech]  FULL CODE     LOVENOX    UNNA BOOT--BLE---7.11.2022  COVID-19--NEGATIVE    Anti-infectives:   Ancef  IV    BLE edema---cellulitis---7.8.2022          DUS--BLE---7.8.2022--peroneal veins not visualized due to BLE edema---otherwise no DVT  BLE swelling--chronic  Dehydration---7.8.2022  Left breast mass--detected c. one month PTA           CTA--chest---7.8.2022----18 mm ground glass density peripheral RUL-----4.3 x 3 cm mass left breast  RUL mass--POA--7.8.2022  Cerebrovascular disease        CVA---2021---dysarthria----expressive aphasia        CT head---7.8.2022---no MBS--chronic microvascular change        EKG---7.8.2022---SR--76---PACs--LAD--incomplete RBBB--LVH                       2D ECHO--8.21.2018---concentric LVH---NLVSF--trivial MR-TR--AV not well visualized--                                        normal AR 2.6 cm--Grade I DD----LVEF ~ 60%                     EKG--5. 2.2018--NSR--68--LAD---LVH     Hypertension  Hyperlipidemia   Diabetes Mellitus Type 2  CKD--Stage 3a  Obesity   Obesity hypoventilation syndrome   Chronic respiratory acidosis  AMMON--non-compliant with CPAP   GERD  PMH:    cancer--type??  PSH:   cholecystectomy, appendectomy, colon---type? Allergies:     NKDA             Plan:  1. Cellulitis---cont'd Ancef IV--UNNA boots BLE  2.   Dehydration--IVF--encourage fluid intake  3. Breast mass outpatient  4. DM2---current regimen  5. Working on placement  6.    See orders     Electronically signed by Yumiko Aguilar on 7/11/2022 at 11:46 AM    Hospitalist

## 2022-07-11 NOTE — PROGRESS NOTES
Occupational Therapy  Facility/Department: [facility]  Occupational Therapy Initial Assessment    Name: Christopher Arellano  : 1945  MRN: [id number]  Date of Service: 2022    Discharge Recommendations:  [address] with OT      Patient Diagnosis(es): The primary encounter diagnosis was Cellulitis of lower extremity, unspecified laterality. Diagnoses of Bilateral leg edema and Breast mass, left were also pertinent to this visit. Past Medical History:  has a past medical history of Acute cystitis, Cancer ([facility] [address] Chronic respiratory acidosis, Diabetes mellitus ([facility] [address] GERD (gastroesophageal reflux disease), Hypercapnia, Hyperlipidemia, Hypertension, Left leg cellulitis, and Obesity hypoventilation syndrome ([facility] [address] Past Surgical History:  has a past surgical history that includes Cholecystectomy; Appendectomy; and Colon surgery. Treatment Diagnosis: general weakness    Assessment   Performance deficits / Impairments: Decreased functional mobility ; Decreased ADL status; Decreased cognition;Decreased endurance;Decreased high-level IADLs;Decreased balance  Treatment Diagnosis: general weakness  Prognosis: Good  Decision Making: Low Complexity           Plan   Plan  Times per Week: 1-2  Current Treatment Recommendations: Self-Care / ADL,Equipment evaluation, education, & procurement,Patient/Caregiver education & training,Functional mobility training,Cognitive/Perceptual training       Subjective   General  Chart Reviewed: Yes  Patient assessed for rehabilitation services?: Yes  Family / Caregiver Present: No  Referring Practitioner: LENORA Simpson  Diagnosis: BLE cellulitis  Subjective  Subjective: Patient rec'd in chair, pleasant and cooperative 76 yr old female with BLE swelling     Social/Functional History  Social/Functional History  Lives With: Alone  Type of Home: House  Home Layout: Two level,Performs ADL's on one level  Bathroom Shower/Tub:  (Patient states that she utilizes sponge baths at home.  Patient reports that she does not use her bathroom due to not having running water at home.)  Bathroom Toilet: Standard  Home Equipment: Cane,Walker, [address] Help From: [employer])  ADL Assistance: Independent  Homemaking Assistance: Independent  Homemaking Responsibilities: Yes  Meal Prep Responsibility: Primary  Laundry Responsibility: Primary  Cleaning Responsibility: Primary  Shopping Responsibility: Secondary (friends bring groceries and food)  Ambulation Assistance: Independent  Transfer Assistance: Independent  Active : No  Patient's  Info: Friends  Occupation: Retired  Additional Comments: Patient reports that her home does not have running water. States that her friends brings her gallons of water that she uses for her water supply. Objective   Heart Rate: 57  Heart Rate Source: Monitor  BP: (!) 128/47  BP Location: Right Arm  BP Method: Automatic  Patient Position: Up in chair  MAP (Calculated): 74  Resp: 18  SpO2: 97 %  O2 Device: None (Room air)             Safety Devices  Type of Devices: Left in chair;Nurse notified;Call light within reach  Bed Mobility Training  Bed Mobility Training: No  Transfer Training  Transfer Training: Yes  Overall Level of Assistance: Supervision  Sit to Stand: Supervision  Stand to Sit: Supervision  Stand Pivot Transfers: Supervision  Gait Training  Gait Training: Yes  Gait  Overall Level of Assistance: Contact-guard assistance  Base of Support: Narrowed; Shift to left  Speed/Leyda: Shuffled  Step Length: Right shortened;Left shortened  Gait Abnormalities: Decreased step clearance; Path deviations; Shuffling gait  Distance (ft): 200 Feet  Assistive Device: Gait belt        ADL  LE Dressing: Stand by assistance  Toileting: Contact guard assistance     Activity Tolerance  Activity Tolerance: Patient tolerated treatment well     Transfers  Sit to stand: Contact guard assistance  Stand to sit: Contact guard assistance     Cognition  Overall Cognitive Status: Exceptions  Memory: Decreased recall of biographical Information  Safety Judgement: Decreased awareness of need for assistance;Decreased awareness of need for safety  Insights: Decreased awareness of deficits  Cognition Comment: Patient demonstrates expressive aphasia.   Orientation  Overall Orientation Status: Within Functional Limits         LUE AROM (degrees)  LUE AROM : WFL  Left Hand AROM (degrees)  Left Hand AROM: WFL  RUE AROM (degrees)  RUE AROM : WFL  Right Hand AROM (degrees)  Right Hand AROM: WFL        Goals  Short Term Goals  Time Frame for Short term goals: duration of hospital stay  Short Term Goal 1: Patient to complete toilet transfer and toileting tasks with S/SBA using a/e as needed  Short Term Goal 2: Patient to complete UB/LB dressing tasks with S/SBA using a/e as needed       Therapy Time   Individual Concurrent Group Co-treatment   Time In 1435         Time Out 1452         Minutes 17         Timed Code Treatment Minutes: 0 Minutes       ARUN ROY OTR, OT

## 2022-07-12 NOTE — FLOWSHEET NOTE
rounding in PCU. Assessment: Patient was sitting up in bed and feeling better than the day before. Patient was encouraged by a phone call from her niece.  provided a brochure from her [employer] as requested by the Patient. Patient is optimistic about her recovery and looking forward to returning home soon. 07/12/22 1606   Encounter Summary   Service Provided For: Patient   Referral/Consult From: [address] Family members;Friends/neighbors   Last Encounter  07/12/22   Complexity of Encounter Low   Begin Time 1525   End Time  1534   Total Time Calculated 9 min   Encounter    Type Follow up   Spiritual/Emotional needs   Type Spiritual Support   Assessment/Intervention/Outcome   Assessment Calm; Hopeful   Intervention Active listening;Prayer (assurance of)/[place]; Other (Comment)  (provided [employer]ist information as requested)   Outcome Encouraged;Engaged in conversation;Expressed Gratitude; Optimistic   Plan and Referrals   Plan/Referrals Continue to visit, (comment)       Intervention: Engaged in conversation and active listening. Prayed with Patient. Outcome: Patient expressed appreciation for visit and offer of continued prayer. Plan: Chaplains are available on site or on call 24/7 for spiritual and emotional support.     Electronically signed by Ivette Starr on 7/12/2022 at 4:10 PM

## 2022-07-12 NOTE — PROGRESS NOTES
Hospitalist Progress Note    Patient:  Ines Jones     YOB: 1945    MRN: [id number]   Admit date: 7/8/2022     Acct: [de-identified]     PCP: Deisy Rodrigues, DO    CC--Interval History:    BLE edema--cellulitis---IV Ancef---UNNA boots    Dehydration--improved     \"Sundowner\"  last night---back to cheerful baseline today    Left breast mass--to be evaluated by General Surgery---outpatient    RUL mass---given breast mass---concerning for possible metastatic disease    HTN---148/64    CKD--Stage 3a----stable    DM2---BG = 87    See note below       All other ROS negative except noted in HPI    Diet:  ADULT DIET; Regular; 4 carb choices (60 gm/meal);  Low Fat/Low Chol/High Fiber/RDEA    Medications:  Scheduled Meds:   ceFAZolin  1,000 mg IntraVENous Q8H    sodium chloride flush  5-40 mL IntraVENous 2 times per day    enoxaparin  40 mg SubCUTAneous Daily    aspirin  81 mg Oral Daily    docusate sodium  100 mg Oral BID     Continuous Infusions:   sodium chloride 25 mL (07/12/22 0922)     PRN Meds:melatonin, sodium chloride flush, sodium chloride, ondansetron **OR** ondansetron, polyethylene glycol, acetaminophen **OR** acetaminophen, albuterol    Objective:  Labs:  CBC with Differential:    Lab Results   Component Value Date/Time    WBC 7.6 07/12/2022 06:40 AM    RBC 4.65 07/12/2022 06:40 AM    HGB 15.0 07/12/2022 06:40 AM    HCT 45.0 07/12/2022 06:40 AM     07/12/2022 06:40 AM    MCV 96.8 07/12/2022 06:40 AM    MCH 32.3 07/12/2022 06:40 AM    MCHC 33.3 07/12/2022 06:40 AM    RDW 11.9 07/12/2022 06:40 AM    LYMPHOPCT 36 07/12/2022 06:40 AM    MONOPCT 8 07/12/2022 06:40 AM    BASOPCT 1 07/12/2022 06:40 AM    MONOSABS 0.62 07/12/2022 06:40 AM    LYMPHSABS 2.75 07/12/2022 06:40 AM    EOSABS 0.62 07/12/2022 06:40 AM    BASOSABS 0.06 07/12/2022 06:40 AM     BMP:    Lab Results   Component Value Date/Time     07/12/2022 06:40 AM    K 4.3 07/12/2022 06:40 AM     07/12/2022 06:40 AM    CO2 26 07/12/2022 06:40 AM    BUN 20 07/12/2022 06:40 AM    LABALBU 4.0 07/08/2022 03:43 PM    CREATININE 0.94 07/12/2022 06:40 AM    CALCIUM 10.1 07/12/2022 06:40 AM    GFRAA >60 07/12/2022 06:40 AM    LABGLOM 57 07/12/2022 06:40 AM    GLUCOSE 87 07/12/2022 06:40 AM           Physical Exam:  Vitals: BP (!) 148/64   Pulse 69   Temp 97.4 °F (36.3 °C) (Tympanic)   Resp 18   Ht 5' 10\" (1.778 m)   Wt 195 lb 9.6 oz (88.7 kg)   SpO2 93%   BMI 28.07 kg/m²   24 hour intake/output:    Intake/Output Summary (Last 24 hours) at 7/12/2022 1216  Last data filed at 7/12/2022 0920  Gross per 24 hour   Intake 710 ml   Output --   Net 710 ml     Last 3 weights: Wt Readings from Last 3 Encounters:   07/12/22 195 lb 9.6 oz (88.7 kg)   07/08/22 195 lb (88.5 kg)   08/14/18 218 lb (98.9 kg)     HEENT: Normocephalic and Atraumatic  Neck: Supple, No Masses, Tenderness, Nodularity and No Lymphadenopathy  Chest/Lungs: Clear to Auscultation without Rales, Rhonchi, or Wheezes  Cardiac: Regular Rate and Rhythm  GI/Abdomen: Bowel Sounds Present and Soft, Non-tender, without Guarding or Rebound Tenderness  : Not examined  EXT/Skin: UNNA boot present BLE  Neuro: alert--interactive--gait-balance instability      Assessment:    Principal Problem:    Cellulitis of lower extremity  Active Problems:    Left breast mass    Right upper lobe pulmonary nodule    Expressive aphasia    Dehydration  Resolved Problems:    * No resolved hospital problems. SASKIA Jerome  77  WF   MASSIMO Cornejo---[place];  [facility] speech]  FULL CODE     LOVENOX    UNNA BOOT--BLE---7.11.2022  COVID-19--NEGATIVE    Anti-infectives:   Ancef  IV    BLE edema---cellulitis---7.8.2022          DUS--BLE---7.8.2022--peroneal veins not visualized due to BLE edema---otherwise no DVT  BLE swelling--chronic  Dehydration---7.8.2022  Left breast mass--detected c. one month PTA           CTA--chest---7.8.2022----18 mm ground glass density peripheral RUL-----4.3 x 3 cm mass left

## 2022-07-12 NOTE — PLAN OF CARE
Problem: Discharge Planning  Goal: Discharge to home or other facility with appropriate resources  Outcome: Progressing  Flowsheets (Taken 7/12/2022 0916)  Discharge to home or other facility with appropriate resources: Identify barriers to discharge with patient and caregiver     Problem: Safety - Adult  Goal: Free from fall injury  Outcome: Progressing     Problem: ABCDS Injury Assessment  Goal: Absence of physical injury  Outcome: Progressing     Problem: Skin/Tissue Integrity  Goal: Absence of new skin breakdown  Description: 1. Monitor for areas of redness and/or skin breakdown  2. Assess vascular access sites hourly  3. Every 4-6 hours minimum:  Change oxygen saturation probe site  4. Every 4-6 hours:  If on nasal continuous positive airway pressure, respiratory therapy assess nares and determine need for appliance change or resting period.   Outcome: Progressing     Problem: Chronic Conditions and Co-morbidities  Goal: Patient's chronic conditions and co-morbidity symptoms are monitored and maintained or improved  Outcome: Progressing  Flowsheets (Taken 7/12/2022 0916)  Care Plan - Patient's Chronic Conditions and Co-Morbidity Symptoms are Monitored and Maintained or Improved: Monitor and assess patient's chronic conditions and comorbid symptoms for stability, deterioration, or improvement

## 2022-07-12 NOTE — PROGRESS NOTES
Physical Therapy  Facility/Department: [facility]  PROGRESSIVE CARE  Daily Treatment Note  NAME: Christopher Arellano  : 1945  MRN: [id number]    Date of Service: 2022    Discharge Recommendations:  [address] with PT,Continue to assess pending progress        Patient Diagnosis(es): The primary encounter diagnosis was Cellulitis of lower extremity, unspecified laterality. Diagnoses of Bilateral leg edema and Breast mass, left were also pertinent to this visit. Assessment   Activity Tolerance: Patient tolerated treatment well     Plan    Plan  Current Treatment Recommendations: Strengthening;Balance training;Functional mobility training;Transfer training;ADL/Self-care training;Gait training;Neuromuscular re-education;Home exercise program;Safety education & training;Patient/Caregiver education & training; Therapeutic activities     Restrictions        Subjective    Subjective  Subjective: Patient in bedside chair requiring increased coaxing to participate. No pain rating. Orientation  Overall Orientation Status: Within Functional Limits  Cognition  Safety Judgement: Decreased awareness of need for assistance     Objective   Vitals  O2 Device: None (Room air)  Bed Mobility Training  Bed Mobility Training: No  Transfer Training  Transfer Training: Yes  Overall Level of Assistance: Modified independent;Supervision  Sit to Stand: Supervision  Stand to Sit: Modified independent  Stand Pivot Transfers: Modified independent  Gait Training  Gait Training: Yes  Gait  Overall Level of Assistance: Supervision  Base of Support: Narrowed; Shift to left  Speed/Leyda: Shuffled  Step Length: Right shortened;Left shortened  Gait Abnormalities: Decreased step clearance; Path deviations; Shuffling gait  Distance (ft): 175 Feet  Assistive Device: Gait belt           Safety Devices  Type of Devices: Left in chair;Nurse notified;Call light within reach; Chair alarm in place       Goals  Short Term Goals  Time Frame for Short term goals: Length of stay  Short term goal 1: Patient will complete bed mobility MOD I  Short term goal 2: Patient will complete transfers MOD I  Short term goal 3: Patient will ambulate 21' with MOD I/supervision    Education  Patient Education  Education Given To: Patient  Education Provided: Role of Therapy;Plan of Care;Home Exercise Program  Education Method: Verbal;Demonstration  Education Outcome: Verbalized understanding    Therapy Time   Individual Concurrent Group Co-treatment   Time In 0849         Time Out 0903         Minutes [address]

## 2022-07-12 NOTE — PROGRESS NOTES
Speech Language Pathology  Facility/Department: [address] [place] [address] CARE  Daily Treatment Note  NAME: Celestine Barillas  : 1945  MRN: [id number]    Date of Eval: 2022  Evaluating Therapist: ELAINE Gay    Patient Diagnosis(es): has AMMON (obstructive sleep apnea); Hypertension; Type 2 diabetes mellitus without complication ([facility] [address] Class 1 obesity due to excess calories with serious comorbidity and body mass index (BMI) of 34.0 to 34.9 in adult; Insomnia; Frequent headaches; Leg swelling; Stroke-like symptoms; Impaired gait; Left breast mass; Right upper lobe pulmonary nodule; Expressive aphasia; Cellulitis of lower extremity; and Dehydration on their problem list.  Onset Date: Admission to hospital on 22; patient reported at evaluation that garbled speech/expressive aphasia had occurred anywhere from 6 months to 3 years ago (patient is a poor historian). Primary Complaint: Patient indicated that no changes were noted in thinking/memory. She stated that sometimes it was hard to [employer] [place] out. \"     Pain:  Pain Assessment  Pain Assessment: None - Denies Pain    Oral Motor / Motor Speech: Addressed multisyllablic word productions, as increase in garbled speech noted with 2-3 syllable words. Utilized cues for pacing, with tapping on her knee during production to slow rate. She repeated 3 syllables words accurately 7/10 trials, improving intelligibility for remaining 3 trials when the therapist worked with the patient on rate and over articulation of sounds. Auditory Comprehension:  WFL for basic tasks        Verbal Expression:      Patient completed phrase completion tasks to address expressive naming. For basic/known sentence completion in which the patient's response required only a single word, she was 100% accuracy for all 10 trials. When addressing more open ended sentence completion, she answered 8/10 independently, increasing to 9/10 with minimal cues.  Most responses were appropriate in length, with using anywhere from 1-4 words in a response. Cognitive Linguistic:   Completed basic sustained attention tasks to address task maintenance. SLP utilized simple addition in order to address. Completed 20 basic single digit addition problems mentally with 100% accuracy without repetition needed or cues needed to return to task. SLP then had patient complete concrete naming via naming as many items in one minute as possible. For the first task the patient named 8 items, with only 1 repetition. For the second task she named 13 items with no repetitions. Patient/Family/Caregiver Education: educated regarding use of pacing strategies     Impressions:   Expressive language deficits noted with advancing language tasks. Patient able to participate in simple conversational exchanges in which responses are short and more concrete in nature. Intelligibility okay for single syllable words, with worsening intelligibility with multi syllabic words. Benefited from control for rate and to slow production in order to over articulate. She was able to follow commands and use simple strategies to improve overall intelligibility. When addressing basic cognitive function, she was able to maintain attention to a given task for small period of time (tasks ranged from 1-3 minutes in length primarily) without cues needed to participate. May have difficulty with sustaining attention for longer periods of time or with the implementation of a distraction. Goals:  1. Patient will use strategies to produce 3-syllable words in 8/10 trials. - 7/10 independently, 10/10 verbal cues  2. Patient will complete open-ended sentences 4/5 trials. - 8/10 independently, 9/10 verbal prompts (more advanced open ended)  3. Patient will complete sustained attention tasks with 80% accuracy. -100%    Plan:  Continued daily Speech/Language treatment with goals per  plan of care.     Time in: 13:35   Time out: 14:00 Speech Therapy Prognosis  Prognosis: Fair  Recommendations  Requires SLP Intervention: Yes  Referral To: Neurology          Poly Og, SLP

## 2022-07-13 NOTE — TELEPHONE ENCOUNTER
[facility] called stating that the mammogram order for pt needs to be [facility] ([id number]). Please advise.

## 2022-07-13 NOTE — TELEPHONE ENCOUNTER
Jamey Malik from scheduling called and stated that pt had multiple tests completed while she was admitted to the hospital. Does pt still need USN and mammogram? Please advise.

## 2022-07-13 NOTE — PLAN OF CARE
Problem: Discharge Planning  Goal: Discharge to home or other facility with appropriate resources  Outcome: Progressing  Flowsheets  Taken 7/13/2022 0930 by Mendel Herald, RN  Discharge to home or other facility with appropriate resources: Refer to discharge planning if patient needs post-hospital services based on physician order or complex needs related to functional status, cognitive ability or social support system  Taken 7/12/2022 2220 by Boy Mclaughlin RN  Discharge to home or other facility with appropriate resources:   Identify barriers to discharge with patient and caregiver   Refer to discharge planning if patient needs post-hospital services based on physician order or complex needs related to functional status, cognitive ability or social support system     Problem: Safety - Adult  Goal: Free from fall injury  Outcome: Progressing     Problem: ABCDS Injury Assessment  Goal: Absence of physical injury  Outcome: Progressing     Problem: Skin/Tissue Integrity  Goal: Absence of new skin breakdown  Description: 1. Monitor for areas of redness and/or skin breakdown  2. Assess vascular access sites hourly  3. Every 4-6 hours minimum:  Change oxygen saturation probe site  4. Every 4-6 hours:  If on nasal continuous positive airway pressure, respiratory therapy assess nares and determine need for appliance change or resting period.   Outcome: Progressing     Problem: Chronic Conditions and Co-morbidities  Goal: Patient's chronic conditions and co-morbidity symptoms are monitored and maintained or improved  Outcome: Progressing  Flowsheets  Taken 7/13/2022 0930 by Mendel Herald, RN  Care Plan - Patient's Chronic Conditions and Co-Morbidity Symptoms are Monitored and Maintained or Improved: Monitor and assess patient's chronic conditions and comorbid symptoms for stability, deterioration, or improvement  Taken 7/12/2022 2220 by Boy Mclaughlin RN  Care Plan - Patient's Chronic Conditions and Co-Morbidity Symptoms are Monitored and Maintained or Improved:   Monitor and assess patient's chronic conditions and comorbid symptoms for stability, deterioration, or improvement   Collaborate with multidisciplinary team to address chronic and comorbid conditions and prevent exacerbation or deterioration   Update acute care plan with appropriate goals if chronic or comorbid symptoms are exacerbated and prevent overall improvement and discharge

## 2022-07-13 NOTE — PROGRESS NOTES
[place] Boots were Removed by nurse per Doctor Valerie Dominguez. Dr. Valerie Dominguez assessed patients Lower extremities, noted improvement. New Unna boots were put back on per Dr. Valerie Dominguez. Alexus ALICIA and writer put in2apps on patient at [phone]. Patient tolerated well.

## 2022-07-13 NOTE — PROGRESS NOTES
Hospitalist Progress Note    Patient:  Sergei Pickard     YOB: 1945    MRN: [id number]   Admit date: 7/8/2022     Acct: [de-identified]     PCP: DO ROB Domingo--Interval History:    BLE edema--cellulitis---on Ancef IV--improved--UNNA boots replaced today    Left mass--outpatient evaluation    DM2---BG = 87--103    HTN---BP = 132/54    RUL mass--outpatient evaluation    See note below        All other ROS negative except noted in HPI    Diet:  ADULT DIET; Regular; 4 carb choices (60 gm/meal);  Low Fat/Low Chol/High Fiber/DREA    Medications:  Scheduled Meds:   ceFAZolin  1,000 mg IntraVENous Q8H    sodium chloride flush  5-40 mL IntraVENous 2 times per day    enoxaparin  40 mg SubCUTAneous Daily    aspirin  81 mg Oral Daily    docusate sodium  100 mg Oral BID     Continuous Infusions:   sodium chloride Stopped (07/13/22 1025)     PRN Meds:melatonin, sodium chloride flush, sodium chloride, ondansetron **OR** ondansetron, polyethylene glycol, acetaminophen **OR** acetaminophen, albuterol    Objective:  Labs:  CBC with Differential:    Lab Results   Component Value Date/Time    WBC 8.0 07/13/2022 04:24 AM    RBC 4.46 07/13/2022 04:24 AM    HGB 14.5 07/13/2022 04:24 AM    HCT 42.7 07/13/2022 04:24 AM     07/13/2022 04:24 AM    MCV 95.7 07/13/2022 04:24 AM    MCH 32.5 07/13/2022 04:24 AM    MCHC 34.0 07/13/2022 04:24 AM    RDW 12.0 07/13/2022 04:24 AM    LYMPHOPCT 37 07/13/2022 04:24 AM    MONOPCT 9 07/13/2022 04:24 AM    BASOPCT 1 07/13/2022 04:24 AM    MONOSABS 0.68 07/13/2022 04:24 AM    LYMPHSABS 2.93 07/13/2022 04:24 AM    EOSABS 0.63 07/13/2022 04:24 AM    BASOSABS 0.08 07/13/2022 04:24 AM     BMP:    Lab Results   Component Value Date/Time     07/13/2022 04:24 AM    K 3.9 07/13/2022 04:24 AM     07/13/2022 04:24 AM    CO2 27 07/13/2022 04:24 AM    BUN 22 07/13/2022 04:24 AM    LABALBU 4.0 07/08/2022 03:43 PM    CREATININE 1.04 07/13/2022 04:24 AM    CALCIUM 10.1 07/13/2022 04:24 AM    GFRAA >60 07/13/2022 04:24 AM    LABGLOM 51 07/13/2022 04:24 AM    GLUCOSE 103 07/13/2022 04:24 AM           Physical Exam:  Vitals: BP (!) 141/73   Pulse 62   Temp 97.4 °F (36.3 °C) (Tympanic)   Resp 18   Ht 5' 10\" (1.778 m)   Wt 195 lb 9.6 oz (88.7 kg)   SpO2 97%   BMI 28.07 kg/m²   24 hour intake/output:    Intake/Output Summary (Last 24 hours) at 7/13/2022 1355  Last data filed at 7/13/2022 1223  Gross per 24 hour   Intake 480.47 ml   Output --   Net 480.47 ml     Last 3 weights: Wt Readings from Last 3 Encounters:   07/13/22 195 lb 9.6 oz (88.7 kg)   07/08/22 195 lb (88.5 kg)   08/14/18 218 lb (98.9 kg)     HEENT: Normocephalic and Atraumatic  Neck: Supple, No Masses, Tenderness, Nodularity and No Lymphadenopathy  Chest/Lungs: Clear to Auscultation without Rales, Rhonchi, or Wheezes  Cardiac: Regular Rate and Rhythm  GI/Abdomen: Bowel Sounds Present and Soft, Non-tender, without Guarding or Rebound Tenderness  : Not examined  EXT/Skin: decreased erythema and tenderness---no \"bubble wrap\" appearance--, No Cyanosis, No Clubbing and Edema Present  Neuro: alert---generalized weakness---dysarthria no change--expressive aphasia slow responses---gait balance instability      Assessment:    Principal Problem:    Cellulitis of lower extremity  Active Problems:    Left breast mass    Right upper lobe pulmonary nodule    Expressive aphasia    Dehydration  Resolved Problems:    * No resolved hospital problems. SASKIA Moy  77  WF   MASSIMO Beal---[place];  [facility] speech]  FULL CODE     LOVENOX    UNNA BOOT--BLE---7.11.2022--replaced--7.13.2022  COVID-19--NEGATIVE    Anti-infectives:   Ancef  IV    BLE edema---cellulitis---7.8.2022          DUS--BLE---7.8.2022--peroneal veins not visualized due to BLE edema---otherwise no DVT  BLE swelling--chronic  Dehydration---7.8.2022  Left breast mass--detected c. one month PTA           CTA--chest---7.8.2022----18 mm ground glass density peripheral RUL-----4.3 x 3 cm mass left breast  RUL mass--POA--7.8.2022  [employer]  Cerebrovascular disease        CVA---2021---dysarthria----expressive aphasia        CT head---7.8.2022---no MBS--chronic microvascular change        EKG---7.8.2022---SR--76---PACs--LAD--incomplete RBBB--LVH                       2D ECHO--8.21.2018---concentric LVH---NLVSF--trivial MR-TR--AV not well visualized--                                        normal AR 2.6 cm--Grade I DD----LVEF ~ 60%                     EKG--5. 2.2018--NSR--68--LAD---LVH     Hypertension  Hyperlipidemia   Diabetes Mellitus Type 2  CKD--Stage 3a  Obesity   Obesity hypoventilation syndrome   Chronic respiratory acidosis  AMMON--non-compliant with CPAP   GERD  PMH:    cancer--type??  PSH:   cholecystectomy, appendectomy, colon---type? Allergies:     NKDA     Plan:    1. BLE edema---cellulitis---cont'd Ancef IV---UNNA boots replaced  2. Physical therapy--OT  3. Working on placement  4.     See orders     Electronically signed by Art Parikh on 7/13/2022 at 1:55 PM    Hospitalist

## 2022-07-13 NOTE — PROGRESS NOTES
Physical Therapy  Facility/Department: [facility]  PROGRESSIVE CARE  Daily Treatment Note  NAME: Estela Burciaga  : 1945  MRN: [id number]    Date of Service: 2022    Discharge Recommendations:  [address] with PT,Continue to assess pending progress        Patient Diagnosis(es): The primary encounter diagnosis was Cellulitis of lower extremity, unspecified laterality. Diagnoses of Bilateral leg edema and Breast mass, left were also pertinent to this visit. Assessment   Activity Tolerance: Patient tolerated treatment well     Plan    Plan  Current Treatment Recommendations: Strengthening;Balance training;Functional mobility training;Transfer training;ADL/Self-care training;Gait training;Neuromuscular re-education;Home exercise program;Safety education & training;Patient/Caregiver education & training; Therapeutic activities     Restrictions        Subjective    Subjective  Subjective: Patient supine in bed upon arrival. Agreeable to session. Agreeable to attempting stairs. No complaints of pain. Pain: denies pain  Orientation  Overall Orientation Status: Within Functional Limits     Objective   Vitals  O2 Device: None (Room air)  Bed Mobility Training  Bed Mobility Training: Yes  Overall Level of Assistance: Modified independent  Rolling: Modified independent  Supine to Sit: Modified independent  Sit to Supine: Modified independent  Scooting: Modified independent  Transfer Training  Transfer Training: Yes  Overall Level of Assistance: Modified independent  Sit to Stand: Modified independent  Stand to Sit: Modified independent  Stand Pivot Transfers: Modified independent  Gait Training  Gait Training: Yes  Gait  Overall Level of Assistance: Modified independent  Base of Support: Narrowed; Shift to left  Speed/Leyda: Shuffled  Step Length: Right shortened;Left shortened  Swing Pattern: Left symmetrical;Right symmetrical  Gait Abnormalities: Decreased step clearance; Path deviations; Shuffling gait  Distance (ft): 200 Feet  Assistive Device: Gait belt  Stairs - Level of Assistance: Contact-guard assistance  Number of Stairs Trained: 12 (Use of one rail)           Safety Devices  Type of Devices: Bed alarm in place;Call light within reach;Gait belt;Left in bed       Goals  Short Term Goals  Time Frame for Short term goals: Length of stay  Short term goal 1: Patient will use strategies to produce 3-syllable words in 8/10 trials. Short term goal 2: Patient will complete open-ended sentences 4/5 trials. Short term goal 3: Patient will complete sustained attention tasks with 80% accuracy.   Short term goal 4: Patient will complete 12 6\" stairs GLADYS with use of 1 or no hand rails using a recriprocal gait pattern for improved safety and independence at home    Education  Patient Education  Education Given To: Patient  Education Provided: Role of Therapy;Plan of Care;Home Exercise Program  Education Method: Verbal    Therapy Time   Individual Concurrent Group Co-treatment   Time In 0141         Time Out 0150         Minutes [address] [place], [place]

## 2022-07-13 NOTE — TELEPHONE ENCOUNTER
Azul Maldonado from [facility] called and stated that the diagnostic code on pt's mammogram needs to be more specific. Please advise.

## 2022-07-13 NOTE — PROGRESS NOTES
Speech Language Pathology  Facility/Department: [facility]  PROGRESSIVE CARE  Daily Treatment Note  NAME: Debbi Mcgill  : 1945  MRN: [id number]    Date of Eval: 2022  Evaluating Therapist: ELAINE Robles    Patient Diagnosis(es): has AMMON (obstructive sleep apnea); Hypertension; Type 2 diabetes mellitus without complication ([facility] [address] Class 1 obesity due to excess calories with serious comorbidity and body mass index (BMI) of 34.0 to 34.9 in adult; Insomnia; Frequent headaches; Leg swelling; Stroke-like symptoms; Impaired gait; Left breast mass; Right upper lobe pulmonary nodule; Expressive aphasia; Cellulitis of lower extremity; and Dehydration on their problem list.  Onset Date: 22    Primary Complaint: Nursing notes speech difficulty, expressive aphasia    Pain:  Pain Assessment  Pain Assessment: None - Denies Pain    GOAL 1: Patient will use strategies to produce 3-syllable words in 8/10 trials. Imitating 3-syllable words: =74% independently, increasing to =83% with self-corrections, increasing to 100% with min-mod cues  *cues related to breaking up the word and placement of articulators  *errors related to /n, g, r/    GOAL 2: Patient will complete open-ended sentences 4/5 trials. =88% independently, increasing to 100% with min cues  *error related to recalling what she did yesterday    GOAL 3: Patient will complete sustained attention tasks with 80% accuracy. Completing alternating addition/subtraction mental math: 20/50=504% independently    Visual scanning: trial 1: 98%  Trial 2: 100%  Visual Scanning with 2 letters: 88%, increased to 97% when cued to scan a second time          Plan:  Continued daily Speech/Language treatment with goals per plan of care. Recommend SNF placement after discharge.    Time in: 3:20 pm  Time out: 3:40 pm                              ELAINE Robles

## 2022-07-14 NOTE — PROGRESS NOTES
05:18 AM    LABGLOM >60 07/14/2022 05:18 AM    GLUCOSE 95 07/14/2022 05:18 AM           Physical Exam:  Vitals: BP (!) 141/58   Pulse 60   Temp 97 °F (36.1 °C) (Tympanic)   Resp 18   Ht 5' 10\" (1.778 m)   Wt 195 lb 1.6 oz (88.5 kg)   SpO2 98%   BMI 27.99 kg/m²   24 hour intake/output:    Intake/Output Summary (Last 24 hours) at 7/14/2022 0849  Last data filed at 7/13/2022 2020  Gross per 24 hour   Intake 612.17 ml   Output --   Net 612.17 ml     Last 3 weights: Wt Readings from Last 3 Encounters:   07/14/22 195 lb 1.6 oz (88.5 kg)   07/08/22 195 lb (88.5 kg)   08/14/18 218 lb (98.9 kg)     HEENT: Normocephalic and Atraumatic  Neck: Supple, No Masses, Tenderness, Nodularity and No Lymphadenopathy  Chest/Lungs: Clear to Auscultation without Rales, Rhonchi, or Wheezes  Cardiac: Regular Rate and Rhythm  GI/Abdomen: Bowel Sounds Present and Soft, Non-tender, without Guarding or Rebound Tenderness  : Not examined  EXT/Skin: UNNA boots BLE, No Cyanosis, No Clubbing and Edema Present  Neuro: alert---generalized weakness---gait-balance instability      Assessment:    Principal Problem:    Cellulitis of lower extremity  Active Problems:    Left breast mass    Right upper lobe pulmonary nodule    Expressive aphasia    Dehydration  Resolved Problems:    * No resolved hospital problems. SASKIA Olivas  77  WF   MASSIMO Waite---[place]--wellcare;  [facility] [address] speech]  FULL CODE     LOVENOX    UNNA BOOT--BLE---7.11.2022--replaced--7.13.2022  COVID-19--NEGATIVE    Anti-infectives:   Ancef  IV    BLE edema---cellulitis---7.8.2022          DUS--BLE---7.8.2022--peroneal veins not visualized due to BLE edema---otherwise no DVT  BLE swelling--chronic  Dehydration---7.8.2022  Left breast mass--detected c. one month PTA           CTA--chest---7.8.2022----18 mm ground glass density peripheral RUL-----4.3 x 3 cm mass left breast  RUL mass--POA--7.8.2022  [facility]  Cerebrovascular disease CVA---2021---dysarthria----expressive aphasia        CT head---7.8.2022---no MBS--chronic microvascular change        EKG---7.8.2022---SR--76---PACs--LAD--incomplete RBBB--LVH                       2D ECHO--8.21.2018---concentric LVH---NLVSF--trivial MR-TR--AV not well visualized--                                        normal AR 2.6 cm--Grade I DD----LVEF ~ 60%                     EKG--5. 2.2018--NSR--68--LAD---LVH     Hypertension  Hyperlipidemia   Diabetes Mellitus Type 2  CKD--Stage 3a  Obesity   Obesity hypoventilation syndrome   Chronic respiratory acidosis  AMMON--non-compliant with CPAP   GERD  PMH:    cancer--type??  PSH:   cholecystectomy, appendectomy, colon---type? Allergies:     NKDA    Plan:  1. Cellulitis BLE---UNNA boots---Ancef  2. Working on placement----needs physical-OT therapies---home setting unsuitable  3.    See orders    Electronically signed by Raman Decker on 7/14/2022 at 8:49 AM    Hospitalist    Add:   To Chris----7.14.202             Medications reviewed             Will need outpatient follow up for  left breast mass---either IR biopsy or General Surgery             BLE cellulitis----PO Omnicef----UNNA boots---change every 3 days until adequately healed             ASA 81 mg PO daily             Physical-OT therapies             Ambulate with assistance--fall precautions             Probiotics             Follow up Dr. Toni Yee, FP--[place]             See orders

## 2022-07-14 NOTE — PROGRESS NOTES
Bernardo Payne NP answered phone. Per [facility] the patient did not arrive. Patient left around 1655. This RN, primary nurse, called niece Bharat Esquivel regarding this issue. Bharat Esquivel states \"I took a nap and have not heard\". The number on the chart is not a valid number for the patient so unable to get a hold of the patient. Will call transportation company. 1905: RN called [employer], who transported the patient from the hospital to [address] [place] [address] Ln states that he brought the patient to [facility] and the patient would not get out of the vehicle. Refused and insisted that she were to be brought home. Patient was brought home by [facility] Lopez: RN called the patient's niece. The patient's niece is going to go to the patients home to check on the patient. Call back number left. 1943: Niece has not called this nurse back. RN called the niece to get an update. She has not got a hold of the patient. The niece is getting in contact with the nursing home and the patient. RN left house supervisor number with the patient if questions arise.

## 2022-07-14 NOTE — TELEPHONE ENCOUNTER
Wong Roger from [facility] called again and stated that you need to put another diagnostic code. Wong Roger from [facility] stated that you put the same one as before. Please advise.  Please call Wong Roger and ask what she wants exactly

## 2022-07-14 NOTE — CARE COORDINATION
Waiting on SNF auth from pt's insurance. Called [facility] for update and they state they haven't heard anything yet.

## 2022-07-14 NOTE — PROGRESS NOTES
Yuan Mondragon at [facility] updated that the patient will be transported to the nursing facility by [facility] at 5 pm.

## 2022-07-14 NOTE — TELEPHONE ENCOUNTER
Evidently the DX code is not working. What is the phone number for \"Zarina from Medicare\"  so we can clarify. Thank you.

## 2022-07-14 NOTE — PROGRESS NOTES
RN attempted to call niece x2. Patient requested RN to call [facility]. [facility] OK to take patient.  Arrival time is around 5 pm.

## 2022-07-14 NOTE — PROGRESS NOTES
Physical Therapy      After discussion with multiple PTs we will be placing pt on hold from PT at this time as pt has met all goals.  Will resume PT if pt has change in medical status    Annabel Giraldo PTA

## 2022-07-14 NOTE — PROGRESS NOTES
rounding on PCU. Assessment:  Patient is very hard to understand. She has a hearing problem. She likes to relate things in her life from the 1970s. She cares for many cats at her home and is anxious to get back to them. Intervention: Engaged in conversation and prayer. Patient expressed gratitude for visit and offer of continued prayer. Plan: Chaplains are available 24/7 to help with spiritual and emotional concerns.

## 2022-07-14 NOTE — PROGRESS NOTES
Speech Language Pathology  Facility/Department: [facility]  PROGRESSIVE CARE  Daily Treatment Note  NAME: Ines Jones  : 1945  MRN: [id number]    Date of Eval: 2022  Evaluating Therapist: ELAINE Monroe    Patient Diagnosis(es): has AMMON (obstructive sleep apnea); Hypertension; Type 2 diabetes mellitus without complication ([facility] [address] Class 1 obesity due to excess calories with serious comorbidity and body mass index (BMI) of 34.0 to 34.9 in adult; Insomnia; Frequent headaches; Leg swelling; Stroke-like symptoms; Impaired gait; Left breast mass; Right upper lobe pulmonary nodule; Expressive aphasia; Cellulitis of lower extremity; and Dehydration on their problem list.  Onset Date: 22    Primary Complaint: Nursing notes speech difficulty, expressive aphasia    Pain:  Pain Assessment  Pain Assessment: None - Denies Pain    GOAL 1: Patient will use strategies to produce 3-syllable words in 8/10 trials. Imitating 3-syllable words: 7/10=70% independently, increasing to 8/10=80% with self-corrections, 9/10=90% min cues, increasing to 100% with several repetitions and moderate cues    Increased errors noted with production of /l/ and /g/. Weak medial consonant production was noted, but had little impact on overall intelligibility. Rate slow for all, with cues related to over-articulating. GOAL 2: Patient will complete open-ended sentences 4/5 trials. 9/10=90% independently, increasing to 100% with min cues    Example needed of how she might complete a sentence. It is possible that hearing loss impacted accuracy, as she may have misunderstood the initial prompt. GOAL 3: Patient will complete sustained attention tasks with 80% accuracy. Completing sustained attention task in which SLP stated a letter and she stated the letter that follows (scrammbled order utilized): =85%    Divergent concrete naming: 10 items in one minute without cues required for attention/redirection.  Named items throughout entirety of given minute. When patient was asked if she recognized writer from previous treatment, patient stated, \"Sure! \" When asked what they had worked on, patient stated, \"my words\" without any prompt given as to what therapist was present for. Plan:  Continued daily Speech/Language treatment with goals per plan of care. Recommend SNF placement after discharge.    Time in: 1:37 pm  Time out: 1:58 pm                              Poly Og, SLP

## 2022-07-14 NOTE — TELEPHONE ENCOUNTER
Kinsey Zelaya from [facility] called again and stated that you need to put another diagnostic code. Kinsey Zelaya from [facility] stated that you put the same one as before. Please advise.

## 2022-07-14 NOTE — PROGRESS NOTES
Physical Therapy  Facility/Department: [facility]  PROGRESSIVE CARE  Daily Treatment Note  NAME: Sonu Romero  : 1945  MRN: [id number]    Date of Service: 2022    Discharge Recommendations:  [address] PT,Continue to assess pending progress        Patient Diagnosis(es): The primary encounter diagnosis was Cellulitis of lower extremity, unspecified laterality. Diagnoses of Bilateral leg edema and Breast mass, left were also pertinent to this visit. Assessment   Activity Tolerance: Patient tolerated treatment well     Plan    Plan  Plan: 1 time a day 7 days a week  Current Treatment Recommendations: Strengthening;Balance training;Functional mobility training;Transfer training;ADL/Self-care training;Gait training;Neuromuscular re-education;Home exercise program;Safety education & training;Patient/Caregiver education & training; Therapeutic activities     Restrictions        Subjective    Subjective  Subjective: Pt in bed upon arrival an pleasantly agrees to session. Pain: denies pain  Orientation  Overall Orientation Status: Within Functional Limits     Objective   Vitals  O2 Device: None (Room air)  Bed Mobility Training  Bed Mobility Training: Yes  Overall Level of Assistance: Modified independent  Rolling: Modified independent  Supine to Sit: Modified independent  Sit to Supine: Modified independent  Scooting: Modified independent  Balance  Sitting: Intact  Standing: Intact  Transfer Training  Transfer Training: Yes  Overall Level of Assistance: Modified independent  Sit to Stand: Modified independent  Stand to Sit: Modified independent  Stand Pivot Transfers: Modified independent  Gait Training  Gait Training: Yes  Gait  Overall Level of Assistance: Modified independent  Base of Support: Narrowed; Shift to left  Speed/Leyda: Shuffled  Step Length: Right shortened;Left shortened  Swing Pattern: Left symmetrical;Right symmetrical  Gait Abnormalities: Decreased step clearance; Path deviations; Shuffling

## 2022-07-14 NOTE — DISCHARGE INSTR - DIET

## 2022-07-15 NOTE — DISCHARGE SUMMARY
[address]                 [address] [place] [address]                               DISCHARGE SUMMARY    PATIENT NAME: Karina Mckeon                       :        1945  MED REC NO:   [id number]                             ROOM:       0203  ACCOUNT NO:   [de-identified]                           ADMIT DATE: 2022  PROVIDER:     Jakob Carlin. Mary Ann Garza MD                  DISCHARGE DATE:  2022    ATTENDING PHYSICIAN OF HOSPITALIZATION:  Ron Godoy MD    PERSONAL PHYSICIAN:  Marleen Rubio, [facility], [address] [place] [address]. The patient seen by [facility]. DIAGNOSES:  1.  Bilateral lower extremity cellulitis, edema, 2022. Doppler ultrasound, bilateral lower extremity, 2022, peroneal  veins not visualized due to bilateral lower extremity edema, otherwise  no DVT; bilateral lower extremity swelling, chronic. 2.  Dehydration, 2022, corrected. 3.  Left breast mass detected approximately 1 month prior to admission. 4.  CT abdomen, chest, 2022, 18 mm ground-glass density peripheral  to the right upper lobe and a 4.3 x 3 cm left mass of the breast.  Both  these issues will need to be further evaluated in the outpatient  setting. 5.  Right upper lobe mass, POA, 2022.  6. \"Sundowner. \"  7.  Cerebrovascular disease, CVA,  with dysarthria, expressive  aphasia. 8.  CT of the head, 2022, no mass, bleed or shift; chronic  microvascular change. 9.  EKG, 2022, sinus rhythm, rate 76, PACs, LAD, incomplete right  bundle-branch block, LVH. Note prior 2D echo on 2018, concentric LVH, normal left  ventricular systolic function, trivial MR, TR, AV not well visualized,  normal AR 2.6 cm, grade 1 diastolic dysfunction, LVEF 60%. Previous EKG, 2018, normal sinus rhythm, rate 68, LAD, LVH for  comparison. 10.  Hypertension, blood pressure 141/58.   11. Apparently, intolerant to the same  and has declined those types of services. LABORATORY DATA:  Around the time of discharge, white cell count 7.5;  hemoglobin 14.8; hematocrit 44.1; platelets 211,667. Sodium 136,  potassium 4.8, chloride 103, CO2 of 24, BUN 26, creatinine 0.90, glucose  95, calcium 10.3 and GFR greater than 60. DISCHARGE INSTRUCTIONS/FOLLOWUP:  Discharged to [address] [place] [address]  on 07/14/2022. DIET:  Diabetic. ACTIVITY:  As tolerated with physical, occupational and speech therapies  continued from the hospital setting. CONDITION AT DISCHARGE:  Fair, improved from admission. The patient is to have Unna boots changed every 3 days until healed. Ambulate with assistance with fall precautions. Physical and  occupational therapy to be continued from the hospital setting. MEDICATIONS:  NEW:  Omnicef (cefdinir) 300 mg p.o. twice daily for three additional  days; aspirin 81 mg p.o. daily; probiotic acidophilus one three times a  day. FOLLOWING MEDICATIONS CONTINUED WITHOUT CHANGE:  Melatonin 3 mg p.o.  nightly p.r.n. sleep; therapeutic multivitamins with minerals one p.o.  daily. Discontinued was clindamycin (Cleocin) and Excedrin Extra Strength. Follow up with the patient's personal physician, Ruthann Conley DO,  Family Practice, [facility]. Any aspect of the patient's care not discussed in the chart and/or  dictation will be addressed and treated as an outpatient. The patient's medications have been reviewed including, but not limited  to, pre-hospital, hospital and discharge medications. The patient  and/or the patient's personal representatives were specifically advised  the only medications to be taken are those set forth in the discharge  orders and no other medications should be taken. Any prior medications  not on the discharge orders are specifically discontinued.         Silvia Narayan MD    D: 07/14/2022 02:44:51       T: 07/14/2022 16:30:24 /[email or web address]  Job#: [id number]     Doc#: [id number]    CC:

## 2022-07-18 NOTE — TELEPHONE ENCOUNTER
Orders are in chart, let us know if you need anything else. Thank you. 6/thin liquid/mildly thick/pureed 10/01/2017

## 2022-07-21 NOTE — TELEPHONE ENCOUNTER
7/28/2022: Called patient again to set up f/u patient has a full voicemail. Called patient to schedule a Follow up got her machine, left a message. Called her contact to leave a message she had a full voicemail.       ----- Message from Aman Becerril DO sent at 7/18/2022  7:36 AM EDT -----  Call pt she must come in for an appt.  There are serious health concerns we have to address

## 2022-08-07 PROBLEM — E86.0 DEHYDRATION: Status: RESOLVED | Noted: 2022-01-01 | Resolved: 2022-01-01

## 2022-09-29 PROBLEM — E87.0 HYPERNATREMIA: Status: ACTIVE | Noted: 2022-01-01

## 2022-09-29 NOTE — CONSULTS
[facility] Neurology   IN-PATIENT SERVICE    STROKE CONSULT NOTE            Date:   9/29/2022  Patient name:  Tyrone Kong  Date of admission:  9/29/2022  YOB: 1945    Stroke consult paged at: 7:31 PM  ED room #: 8  Arrived at bedside at: 7:35 PM    Chief Complaint:     Chief Complaint   Patient presents with    Wound Check    Blood Infection       Reason for Consult:      AMS, right-sided weakness, concern for stroke    History of Present Illness: The patient is a 76 y.o. female with PMH of colon cancer, HTN, HLD, T2DM, OHS, GERD, who presents as a transfer from [facility] after being found down for unknown amount of time. It is unclear who called EMS but they apparently had to knock the patient's door down and found her down on the floor. Patient was altered and covered in feces and maggots. She was taken to [facility] where she underwent evaluation with CT head which was unremarkable. She was found to have multiple deep pressure ulcers on the right side with multiple lab abnormalities. She was subsequently transferred to [place] for further work-up and treatment. Stroke team was consulted upon arrival due to concern for stroke with patient's right-sided weakness worse than the left side. Patient is severely altered and unable to provide any significant history, NIH 20. Last known well appears to be at least 1 week ago. Patient apparently lives at home by herself. Her baseline is unknown.     Prior to arrival patient was on  Antiplatelets/anticoagulants: Aspirin 81 mg daily  Statins: None    Smoking history: Unknown      Past Medical History:     Past Medical History:   Diagnosis Date    Acute cystitis 12/2021    Cancer [facility])     colon    Chronic respiratory acidosis 2018    Diabetes mellitus (HCC)     GERD (gastroesophageal reflux disease)     Hypercapnia 2018    Hyperlipidemia     Hypertension     Left leg cellulitis 12/2021    Obesity hypoventilation syndrome ([facility] [address] 2018        Past Surgical History:     Past Surgical History:   Procedure Laterality Date    APPENDECTOMY      CHOLECYSTECTOMY      COLON SURGERY      for colon cancer        Medications Prior to Admission:     Prior to Admission medications    Medication Sig Start Date End Date Taking? Authorizing Provider   aspirin 81 MG EC tablet Take 1 tablet by mouth daily 7/15/22   Mayra Frye MD   Multiple Vitamins-Minerals (THERAPEUTIC MULTIVITAMIN-MINERALS) tablet Take 1 tablet by mouth daily    Historical Provider, MD   melatonin 3 MG TABS tablet Take 3 mg by mouth nightly as needed    Historical Provider, MD        Allergies:     Patient has no known allergies. Social History:     Tobacco:    reports that she has never smoked. She has never used smokeless tobacco.  Alcohol:      reports no history of alcohol use. Drug Use:  reports no history of drug use. Family History:     Family History   Problem Relation Age of Onset    Alzheimer's Disease Mother     Heart Failure Father     Lung Cancer Sister     Brain Cancer Brother        Review of Systems:     Unable to perform    Physical Exam:   /87   Pulse 72   Resp 14   SpO2 95%   No data recorded. Limited NEUROLOGIC EXAMINATION    Patient altered and not following any significant commands  Only able to say yes to her name  Spontaneously moving and withdrawing all extremities, L>R  Sensation appears grossly intact  Reflexes symmetric    NIH STROKE SCALE:    Time Performed: 7:40 PM      1a. Level of consciousness:  2 - not alert, requires repeated stimulation to attend, or is obtunded and requires strong or painful stimulation to make movements (not stereotyped)   1b. Level of consciousness questions:  2 - answers neither question correctly  1c. Level of consciousness questions:  2 - performs neither task correctly  2. Best Gaze:  0 - normal  3. Visual:  0 - no visual loss  4.     Facial Palsy:  1 - minor paralysis (flattened nasolabial fold, asymmetric on smiling)  5a. Motor left arm:  2 - some effort against gravity, limb cannot get to or maintain (if cued) 90 (or 45) degrees, drifts down to bed, but has some effort against gravity   5b. Motor right arm:  2 - some effort against gravity, limb cannot get to or maintain (if cued) 90 (or 45) degrees, drifts down to bed, but has some effort against gravity   6a. Motor left le - some effort against gravity; leg falls to bed by 5 seconds but has some effort against gravity  6b. Motor right le - some effort against gravity; leg falls to bed by 5 seconds but has some effort against gravity  7. Limb Ataxia:  0 - absent  8. Sensory:  0 - normal; no sensory loss  9. Best Language:  3 - mute, global aphasia; no usable speech or auditory comprehension  10. Dysarthria:  2 - severe; patient speech is so slurred as to be unintelligible in the absence of or our of proportion to any dysphagia, or is mute/anarthric   11. Extinction and Inattention:  0 - no abnormality    TOTAL:  20      Modified Jesus Score Scale:      [] Zero: No symptoms at all   [] 1: No significant disability despite symptoms; able to carry out all usual duties and activities   [] 2: Slight disability; unable to carry out all previous activities, but able to look after own affairs without assistance   [] 3:Moderate disability; requiring some help, but able to walk without assistance   [] 4: Moderately severe disability; unable to walk and attend to bodily needs without assistance   [] 5:Severe disability; bedridden, incontinent and requiring constant nursing care and attention      Diagnostics:      Laboratory Testing:    CBC: No results for input(s): WBC, HGB, PLT in the last 72 hours. BMP:  No results for input(s): NA, K, CL, CO2, BUN, CREATININE, GLUCOSE in the last 72 hours.       Lab Results   Component Value Date    CHOL 187 2022    LDLCHOLESTEROL 114 2022 HDL 41 07/09/2022    TRIG 161 (H) 07/09/2022    ALT 15 07/08/2022    AST 16 07/08/2022    TSH 1.82 07/08/2022    LABA1C 6.1 (H) 07/08/2022           Imaging/Diagnostics:      CT head       Assessment and plan:       Patient Active Problem List   Diagnosis    AMMON (obstructive sleep apnea)    Hypertension    Type 2 diabetes mellitus without complication (HCC)    Class 1 obesity due to excess calories with serious comorbidity and body mass index (BMI) of 34.0 to 34.9 in adult    Insomnia    Frequent headaches    Leg swelling    Stroke-like symptoms    Impaired gait    Left breast mass    Right upper lobe pulmonary nodule    Expressive aphasia    Cellulitis of lower extremity                      76 y.o. female who presents as a transfer from [facility], was found down at home for unknown amount of time. Has severe ulcers on the right side of her body and appears altered. No usable speech and not following any significant commands. Does appear to have right-sided weakness more than the left. CT head unremarkable at outlying facility. Will obtain MRI/MRA brain to rule out stroke. Further metabolic work-up and treatment per primary team.    Last Known Well (date and time): 1 week ago    2. Candidate for IV tPA therapy     Yes []     No  [x] due to the following exclusion criteria: Out of window    3.  Candidate for Thrombectomy    Yes []      No [x] due to the following exclusion criteria: No concern for LVO    - Discussed with Dr. Adella Crigler : done   - MRI Brain WO pending  -MRA head and neck without contrast pending    Medications   - Aspirin 81  mg daily    - Folic acid 1mg BID  - Atorvastatin 80 mg nightly     Labs  - Fasting Lipid panel  - HbA1c      - PT, OT, Speech eval   - Hydration per primary team  - Telemetry   - Neuro checks per protocol  - We recommend SBP normotensive  - Blood glucose goal less than 180  - Please avoid dextrose containing solutions    Additional recommendations may follow    Please contact [facility] with any changes in patients neurologic status. Thank you for your consult.       Alfie Turner MD   PGY-3 Neurology Resident  9/29/2022 at 7:33 PM

## 2022-09-29 NOTE — ED PROVIDER NOTES
[facility] ED  Emergency Department Encounter  Emergency Medicine Resident     Pt Name: Espinoza Cordova  MRN: [id number]  Reginegfpamela 1945  Date of evaluation: 9/29/22  PCP:  Ann Garcia, [address] [place] [address]       Chief Complaint   Patient presents with    Wound Check    Blood Infection       HISTORY OFPRESENT ILLNESS  (Location/Symptom, Timing/Onset, Context/Setting, Quality, Duration, Modifying Teran Ehrich.)      Espinoza Cordova is a 76 y.o. female who presents as a transfer from [facility] where she was brought after being found down for unknown amount of time. According to EMS, the patient's significant other is in a long-term care facility and she is living independently. She was found down with altered mental status, multiple pressure sores, covered in feces and maggots. She has a large and deep pressure sore on the right shoulder, including in few other areas in the right side of the body. Patient unable to provide much more history. Septic work-up was initiated at the other facility. CT of the head was negative. She was started on Zosyn, fluids and required Levophed. On nasal cannula oxygen on arrival.  Patient was hypothermic and temperature was increased to 98 prior to transfer. PAST MEDICAL / SURGICAL / SOCIAL / FAMILY HISTORY      has a past medical history of Acute cystitis, Cancer ([address] Chronic respiratory acidosis, Diabetes mellitus ([address] GERD (gastroesophageal reflux disease), Hypercapnia, Hyperlipidemia, Hypertension, Left leg cellulitis, and Obesity hypoventilation syndrome ([address] has a past surgical history that includes Cholecystectomy; Appendectomy; and Colon surgery.      Social History     Socioeconomic History    Marital status:      Spouse name: Not on file    Number of children: Not on file    Years of education: Not on file    Highest education level: Not on file   Occupational History    Not on file   Tobacco Use    Smoking status: Never Smokeless tobacco: Never   Substance and Sexual Activity    Alcohol use: No    Drug use: No    Sexual activity: Not on file   Other Topics Concern    Not on file   Social History Narrative    Not on file     Social Determinants of Health     Financial Resource Strain: Low Risk     Difficulty of Paying Living Expenses: Not hard at all   Food Insecurity: No Food Insecurity    Worried About Running Out of Food in the Last Year: Never true    Ran Out of Food in the Last Year: Never true   Transportation Needs: Not on file   Physical Activity: Not on file   Stress: Not on file   Social Connections: Not on file   Intimate Partner Violence: Not on file   Housing Stability: Not on file       Family History   Problem Relation Age of Onset    Alzheimer's Disease Mother     Heart Failure Father     Lung Cancer Sister     Brain Cancer Brother         Allergies:  Patient has no known allergies. Home Medications:  Prior to Admission medications    Medication Sig Start Date End Date Taking? Authorizing Provider   aspirin 81 MG EC tablet Take 1 tablet by mouth daily 7/15/22   Rosanna Guerrero MD   Multiple Vitamins-Minerals (THERAPEUTIC MULTIVITAMIN-MINERALS) tablet Take 1 tablet by mouth daily    Historical Provider, MD   melatonin 3 MG TABS tablet Take 3 mg by mouth nightly as needed    Historical Provider, MD       REVIEW OFSYSTEMS    (2-9 systems for level 4, 10 or more for level 5)      Review of Systems   Unable to perform ROS: Mental status change     PHYSICAL EXAM   (up to 7 for level 4, 8 or more forlevel 5)      INITIAL VITALS:   Vitals:    09/30/22 0027 09/30/22 0042 09/30/22 0104 09/30/22 0138   BP:       Pulse: (!) 101 97 (!) 101 (!) 109   Resp: 16 16 15 24   Temp:       SpO2: 94% 94% 95% 98%   Weight:           Physical Exam  Constitutional:       Appearance: Normal appearance. She is normal weight. Comments: Appears ill, is not Fully alert but does open eyes and mumble words.    HENT:      Head: Normocephalic and atraumatic. Nose: Nose normal.      Mouth/Throat:      Mouth: Mucous membranes are dry. Pharynx: Oropharynx is clear. No oropharyngeal exudate or posterior oropharyngeal erythema. Eyes:      Extraocular Movements: Extraocular movements intact. Conjunctiva/sclera: Conjunctivae normal.      Pupils: Pupils are equal, round, and reactive to light. Cardiovascular:      Rate and Rhythm: Regular rhythm. Tachycardia present. Heart sounds: Normal heart sounds. Pulmonary:      Effort: Pulmonary effort is normal. No respiratory distress. Breath sounds: Normal breath sounds. No wheezing, rhonchi or rales. Comments: On 4 L nasal cannula oxygen  Abdominal:      General: There is no distension. Palpations: Abdomen is soft. Tenderness: There is no abdominal tenderness. There is no guarding or rebound. Musculoskeletal:         General: No tenderness. Normal range of motion. Cervical back: Normal range of motion and neck supple. Right lower leg: No edema. Left lower leg: No edema. Skin:     General: Skin is warm and dry. Comments: Multiple excoriated and scabbed areas on the abdomen and chest.  Large and deep pressure wound on the right shoulder with blanching thematous skin around it. Multiple other pressure sores in the right side of body. See attached photos. Neurological:      General: No focal deficit present. Mental Status: She is oriented to person, place, and time. Comments: Does not appear to be moving right side is much as left. Possible right-sided facial droop.   Unable to participate in full neuro exam.   Psychiatric:         Mood and Affect: Mood normal.                               DIFFERENTIAL  DIAGNOSIS     PLAN (LABS / IMAGING / EKG):  Orders Placed This Encounter   Procedures    Culture, Blood 1    Culture, Blood 1    Culture, Urine    MRSA DNA Probe, Nasal    XR SHOULDER RIGHT (MIN 2 VIEWS)    MRI BRAIN WO CONTRAST    MRA HEAD WO CONTRAST    MRA NECK WO CONTRAST    XR CHEST PORTABLE    XR ABDOMEN FOR NG/OG/NE TUBE PLACEMENT    XR CHEST PORTABLE    CBC with Auto Differential    Comprehensive Metabolic Panel    C-Reactive Protein    Troponin    Urinalysis with Microscopic    Lactate, Sepsis    MYOGLOBIN, SERUM    CK    BLOOD GAS, VENOUS    CK    Basic Metabolic Panel    Troponin    Basic Metabolic Panel w/ Reflex to MG    Blood gas, arterial    Calcium, Ionized    CBC with Auto Differential    Magnesium    Troponin    Diet NPO    Vital signs per unit routine    Daily weights    Intake and output    Place intermittent pneumatic compression device    Telemetry monitoring - 72 hour duration    Notify physician    Bedrest with bedside commode    Neurovascular checks    Elevate legs    Elevate Head of Bed     Spontaneous Awakening Trial (SAT)    Apply warming blanket    Full Code    Inpatient consult to Neurology    Inpatient consult to General Surgery    Inpatient consult to Critical Care    Pharmacy to Dose: Vancomycin    OT eval and treat    PT evaluation and treat    Respiratory care evaluation only    End Tidal CO2 Continuous    Pulse oximetry, continuous    Spontaneous Breathing Trial (SBT)    Post Extubation Oxygen Therapy    Mechanical Ventilation with default initial settings    ABG draw    Initiate Oxygen Therapy Protocol    Arterial Blood Gas, POC    EKG 12 Lead    Insert/Change Cardenas Catheter    ADMIT TO INPATIENT    Restraints non-violent or non-self-destructive       MEDICATIONS ORDERED:  Orders Placed This Encounter   Medications    vancomycin (VANCOCIN) 1250 mg in sodium chloride 0.9% 250 mL IVPB     Order Specific Question:   Antimicrobial Indications     Answer:   Skin and Soft Tissue Infection    lactated ringers bolus    hydrocortisone sodium succinate PF (SOLU-CORTEF) injection 100 mg    midazolam PF (VERSED) injection 2 mg    sodium chloride flush 0.9 % injection 5-40 mL    sodium chloride flush 0.9 % injection 5-40 mL 0.9 % sodium chloride infusion    DISCONTD: enoxaparin (LOVENOX) injection 40 mg     Order Specific Question:   Indication of Use     Answer:   Prophylaxis-DVT/PE    OR Linked Order Group     ondansetron (ZOFRAN-ODT) disintegrating tablet 4 mg     ondansetron (ZOFRAN) injection 4 mg    polyethylene glycol (GLYCOLAX) packet 17 g    OR Linked Order Group     acetaminophen (TYLENOL) tablet 650 mg     acetaminophen (TYLENOL) suppository 650 mg    0.9 % sodium chloride infusion    OR Linked Order Group     potassium chloride 20 mEq/50 mL IVPB (Central Line)     potassium chloride 10 mEq/100 mL IVPB (Peripheral Line)    sennosides-docusate sodium (SENOKOT-S) 8.6-50 MG tablet 1 tablet    norepinephrine (LEVOPHED) 16 mg in sodium chloride 0.9 % 250 mL infusion     Order Specific Question:   Titrate Infusion? Answer:   Yes     Order Specific Question:   Initial Infusion Dose: Answer:   10 mcg/min     Order Specific Question:   Goal of Therapy is: Answer:   MAP greater than 65 mmHg     Order Specific Question:   Contact Provider if:     Answer:   Patient is receiving the maximum dose and is not achieving the goal of therapy    MIDAZOLAM 1 MG/ML IN D5W INFUSION     Sander Cabral: cabinet override    DISCONTD: midazolam (VERSED) 1 mg/mL in D5W infusion     Order Specific Question:   Titrate Infusion? Answer:   Yes     Order Specific Question:   Initial Infusion Dose: Answer:   2 mg/hr     Order Specific Question:   Goal of Therapy is: Answer:   RASS of -1 to 0     Order Specific Question:   Contact Provider if:     Answer:   New onset SBP less than 90 mmHg     Order Specific Question:   Contact Provider if:     Answer:   Patient is receiving the maximum dose and is not achieving the goal of therapy    DISCONTD: norepinephrine (LEVOPHED) 16 mg in sodium chloride 0.9 % 250 mL infusion     Order Specific Question:   Titrate Infusion?      Answer:   Yes     Order Specific Question:   Initial Infusion Dose: Answer:   5 mcg/min     Order Specific Question:   Goal of Therapy is: Answer:   MAP greater than 65 mmHg     Order Specific Question:   Contact Provider if:     Answer:   Patient is receiving the maximum dose and is not achieving the goal of therapy    pantoprazole (PROTONIX) 80 mg in sodium chloride 0.9 % 50 mL bolus    phenylephrine (JASON-SYNEPHRINE) 50 mg in sodium chloride 0.9 % 250 mL infusion     Order Specific Question:   Titrate Infusion? Answer:   Yes     Order Specific Question:   Initial Infusion Dose: Answer:   30 mcg/min     Order Specific Question:   Goal of Therapy is: Answer:   MAP greater than 65 mmHg     Order Specific Question:   Contact Provider if:     Answer:   Patient is receiving the maximum dose and is not achieving the goal of therapy    sodium bicarbonate 8.4 % injection     Kenny Roles: cabinet override    potassium chloride 20 mEq/50 mL IVPB (Central Line)    calcium gluconate 2000 mg in sodium chloride 100 mL    vancomycin (VANCOCIN) 1250 mg in sodium chloride 0.9% 250 mL IVPB     Order Specific Question:   Antimicrobial Indications     Answer:   Pneumonia (HAP)     Order Specific Question:   HAP duration of therapy     Answer:   7 days    vancomycin (VANCOCIN) intermittent dosing (placeholder)     Order Specific Question:   Antimicrobial Indications     Answer:   Pneumonia (HAP)     Order Specific Question:   HAP duration of therapy     Answer:   7 days    propofol injection     Order Specific Question:   Titrate Infusion? Answer:   Yes     Order Specific Question:   Initial Infusion Dose:      Answer:   20 mcg/kg/min     Order Specific Question:   Goal of Therapy:     Answer:   RASS of -1 to 1     Order Specific Question:   Contact Provider if:     Answer:   New onset HR less than 50 bpm     Order Specific Question:   Contact Provider if:     Answer:   New onset SBP less than 90 mmHg     Order Specific Question:   Contact Provider if:     Answer:   Patient is receiving maximum dose and is not achieving the goal of therapy     Order Specific Question:   Contact Provider if:     Answer:   Triglycerides greater than 500 mg/dL    piperacillin-tazobactam (ZOSYN) 3,375 mg in dextrose 5 % 50 mL IVPB extended infusion (mini-bag)     Order Specific Question:   Antimicrobial Indications     Answer:   Sepsis of Unknown Etiology     Order Specific Question:   Sepsis duration of therapy     Answer:   7 days    heparin (porcine) injection 5,000 Units       DDX: Sepsis, osteomyelitis, stroke, rhabdomyolysis, severe dehydration and malnutrition    Initial MDM/Plan/ED COURSE:    76 y.o. female who presents with altered mental status after being found down for unknown period of time. Patient presents with multiple pressure sores, dehydration, altered mental status, unable to tell me her name or where she is. She does open eyes and mumble words. Maintaining her airway on supplemental oxygen. Unable to perform full neuro exam, appears to be moving left side greater than right. Multiple pressure sores on the right. Patient received Zosyn at outside facility, will add on vancomycin as well. We will exchange Cardenas catheter for temperature sensing Cardenas as patient was found to be hypothermic as well. We will repeat labs in blood cultures. Patient making minimal urine at this time. She remains on Levophed on arrival.  Receiving fluids. Patient sodium significantly elevated at 155. Likely very dehydrated. Lactic 3.1. ED Course as of 09/30/22 0348   Thu Sep 29, 2022   2210 Creatinine(!): 1.41 [JS]      ED Course User Index  [JS] Washburn Genta, DO      Patient needs central line for continued pressors. Patient became somewhat agitated when trying to ultrasound to look for central line. 2 mg of Versed given prior to the procedure. Patient had decreased respirations with this. Nasal trumpet administered in nonrebreather initiated. Improvement of oxygenation after that. Stress dose steroids also given. 100 mg of Solu-Cortef ordered. Right IJ successfully placed. Chest x-ray ordered afterward. No obvious pneumo. Shortly after line placement, patient's oxygen saturation decreased, she had a bout of coffee-ground emesis and may have aspirated. Patient's mental status and respirations decompensated at that point. She was bagged while setting up for intubation. Patient successfully intubated with 7.5 ET tube after using 20 of etomidate and 80 of rocuronium. Arterial line placed in right femoral artery after failed attempt at left radial artery. Patient remains hypotensive. On Versed for sedation, Levophed continues to require titration up. Patient admitted to critical care. Multiple critical procedures performed and patient's potassium was not replaced during this time for that reason. She was transported soon as possible after all procedures performed. Sodium remains high, she continues to receive fluids. Neuro saw the patient early in the course of her stay and they recommend MRIs for further stroke work-up.     DIAGNOSTIC RESULTS / EMERGENCYDEPARTMENT COURSE / MDM     LABS:  Labs Reviewed   CBC WITH AUTO DIFFERENTIAL - Abnormal; Notable for the following components:       Result Value    WBC 12.6 (*)     RBC 5.27 (*)     Hemoglobin 17.2 (*)     Hematocrit 49.2 (*)     MCHC 35.0 (*)     Immature Granulocytes 1 (*)     Seg Neutrophils 87 (*)     Lymphocytes 8 (*)     Eosinophils % 0 (*)     Segs Absolute 10.96 (*)     All other components within normal limits   COMPREHENSIVE METABOLIC PANEL - Abnormal; Notable for the following components:    Glucose 128 (*)     BUN 93 (*)     Creatinine 1.41 (*)     Sodium 158 (*)     Potassium 3.0 (*)     Chloride 123 (*)     ALT 69 (*)     AST 48 (*)     Total Protein 4.9 (*)     Albumin 1.9 (*)     Albumin/Globulin Ratio 0.6 (*)     GFR Non- 36 (*)     GFR  44 (*)     All other components within normal limits   C-REACTIVE PROTEIN - Abnormal; Notable for the following components:    .2 (*)     All other components within normal limits   TROPONIN - Abnormal; Notable for the following components:    Troponin, High Sensitivity 23 (*)     All other components within normal limits   URINALYSIS WITH MICROSCOPIC - Abnormal; Notable for the following components:    Color, UA Dark Yellow (*)     Turbidity UA Cloudy (*)     Bilirubin Urine NEGATIVE  Verified by ictotest. (*)     Urine Hgb SMALL (*)     Protein, UA 1+ (*)     Leukocyte Esterase, Urine SMALL (*)     Crystals, UA CALCIUM OXALATE (*)     Crystals, UA FEW (*)     Bacteria, UA FEW (*)     All other components within normal limits   LACTATE, SEPSIS - Abnormal; Notable for the following components:    Lactic Acid, Sepsis, Whole Blood 3.1 (*)     All other components within normal limits   LACTATE, SEPSIS - Abnormal; Notable for the following components:    Lactic Acid, Sepsis, Whole Blood 2.6 (*)     All other components within normal limits   BLOOD GAS, VENOUS - Abnormal; Notable for the following components:    pH, Jas 7.153 (*)     pCO2, Jas 71.5 (*)     pO2, Jas 58.1 (*)     Negative Base Excess, Jas 7.2 (*)     All other components within normal limits   CK - Abnormal; Notable for the following components:     Total  (*)     All other components within normal limits   BASIC METABOLIC PANEL - Abnormal; Notable for the following components:    BUN 61 (*)     Creatinine 0.95 (*)     Calcium 5.9 (*)     Sodium 155 (*)     Potassium 1.9 (*)     Chloride 131 (*)     CO2 12 (*)     GFR Non- 57 (*)     All other components within normal limits   TROPONIN - Abnormal; Notable for the following components:    Troponin, High Sensitivity 21 (*)     All other components within normal limits   ARTERIAL BLOOD GAS, POC - Abnormal; Notable for the following components:    POC pH 7.169 (*)     POC HCO3 13.2 (*)     Negative Base Excess, Art 14 (*)     All other components within normal limits   CULTURE, BLOOD 1   CULTURE, BLOOD 1   CULTURE, URINE   MYOGLOBIN, SERUM   CK   BASIC METABOLIC PANEL W/ REFLEX TO MG FOR LOW K   BASIC METABOLIC PANEL W/ REFLEX TO MG FOR LOW K   BASIC METABOLIC PANEL W/ REFLEX TO MG FOR LOW K   CALCIUM, IONIZED   CBC WITH AUTO DIFFERENTIAL   MAGNESIUM   MAGNESIUM   MAGNESIUM   TROPONIN           XR SHOULDER RIGHT (MIN 2 VIEWS)    Result Date: 9/29/2022  EXAMINATION: THREE XRAY VIEWS OF THE RIGHT SHOULDER 9/29/2022 9:39 pm COMPARISON: 09/29/2022 HISTORY: ORDERING SYSTEM PROVIDED HISTORY: deep pressure wound TECHNOLOGIST PROVIDED HISTORY: deep pressure wound Reason for Exam: found down,large sore on shoulder FINDINGS: Degenerative changes are seen within the spine, involving the thoracic spine as well as the cervical spine. Degenerative changes noted in the glenohumeral and acromioclavicular joints. No fracture or dislocation is identified. No osseous destructive process is seen. An IV catheter is seen in the upper arm. No radiopaque foreign body. Soft tissue lucency overlying the upper arm. Patchy infiltrates seen within the lungs. 1. No acute osseous fracture or dislocation. 2. Large soft tissue lucency related to the given history of wound overlying the right proximal shoulder. 3. No osteomyelitis. 4. Patchy infiltrates seen throughout the lungs. XR CHEST PORTABLE    Result Date: 9/30/2022  EXAMINATION: ONE SUPINE XRAY VIEW(S) OF THE ABDOMEN; ONE XRAY VIEW OF THE CHEST 9/29/2022 11:52 pm COMPARISON: None. HISTORY: ORDERING SYSTEM PROVIDED HISTORY: Confirmation of course of NG/OG/NE tube and location of tip of tube TECHNOLOGIST PROVIDED HISTORY: Confirmation of course of NG/OG/NE tube and location of tip of tube Portable? ->Yes Reason for Exam: Supine port,OG; ORDERING SYSTEM PROVIDED HISTORY: post intubation TECHNOLOGIST PROVIDED HISTORY: post intubation Reason for Exam: Supine, ETT FINDINGS: Chest: The endotracheal tube measures 3.9 cm above the lois. The enteric catheter courses below the level the film. Surgical clips in the right upper quadrant. The right-sided central venous catheter tip overlies the cavoatrial junction. Cardiac size is normal.  Mild left perihilar as well as right-sided perihilar and basilar infiltrates, and small to moderate right effusion. Diffuse osteopenia. Multilevel degenerative changes are seen in the spine. Degenerative change in the right shoulder as well. Abdomen: The enteric catheter side hole is seen beyond the GE junction with the tip in the proximal gastric body. Moderate gaseous distention of the stomach. Dilated loops of small bowel are also seen, with some air in the colon suggestive of a partial small bowel obstruction. Elevated right hemidiaphragm with basilar infiltrates. Surgical clips in the right upper quadrant. No definite free air is identified. 1. ET tube is 3.9 cm above the lois. 2. Enteric catheter tip terminates in the proximal gastric body. 3. Bilateral perihilar and right basilar infiltrates with a small to moderate right effusion, suspicious for multilobar pneumonia. 4. Gaseous distention of the stomach, as well as loops of small bowel which appear mildly dilated. Findings felt to represent a partial small bowel obstruction. XR CHEST PORTABLE    Result Date: 9/30/2022  EXAMINATION: ONE XRAY VIEW OF THE CHEST 9/29/2022 11:20 pm COMPARISON: 09/29/2022 HISTORY: ORDERING SYSTEM PROVIDED HISTORY: line placement TECHNOLOGIST PROVIDED HISTORY: line placement FINDINGS: There is a right IJ central venous catheter which terminates in the lower aspect of the right atrium. No pneumothorax is identified. Cardiac silhouette is enlarged. Patchy infiltrates are seen within the lungs bilaterally. No acute osseous abnormality. Degenerative changes seen throughout the spine and left shoulder.      Multifocal infiltrates seen throughout the lungs bilaterally suspicious for pneumonia, with a small right-sided pleural effusion. Right IJ central venous catheter terminates low within the right atrium. The cavoatrial junction is approximately 5 cm proximal from the catheters current position. No pneumothorax. XR ABDOMEN FOR NG/OG/NE TUBE PLACEMENT    Result Date: 9/30/2022  EXAMINATION: ONE SUPINE XRAY VIEW(S) OF THE ABDOMEN; ONE XRAY VIEW OF THE CHEST 9/29/2022 11:52 pm COMPARISON: None. HISTORY: ORDERING SYSTEM PROVIDED HISTORY: Confirmation of course of NG/OG/NE tube and location of tip of tube TECHNOLOGIST PROVIDED HISTORY: Confirmation of course of NG/OG/NE tube and location of tip of tube Portable? ->Yes Reason for Exam: Supine port,OG; ORDERING SYSTEM PROVIDED HISTORY: post intubation TECHNOLOGIST PROVIDED HISTORY: post intubation Reason for Exam: Supine, ETT FINDINGS: Chest: The endotracheal tube measures 3.9 cm above the lois. The enteric catheter courses below the level the film. Surgical clips in the right upper quadrant. The right-sided central venous catheter tip overlies the cavoatrial junction. Cardiac size is normal.  Mild left perihilar as well as right-sided perihilar and basilar infiltrates, and small to moderate right effusion. Diffuse osteopenia. Multilevel degenerative changes are seen in the spine. Degenerative change in the right shoulder as well. Abdomen: The enteric catheter side hole is seen beyond the GE junction with the tip in the proximal gastric body. Moderate gaseous distention of the stomach. Dilated loops of small bowel are also seen, with some air in the colon suggestive of a partial small bowel obstruction. Elevated right hemidiaphragm with basilar infiltrates. Surgical clips in the right upper quadrant. No definite free air is identified. 1. ET tube is 3.9 cm above the lois. 2. Enteric catheter tip terminates in the proximal gastric body.  3. Bilateral perihilar and right basilar infiltrates with a small to moderate right effusion, suspicious for multilobar pneumonia. 4. Gaseous distention of the stomach, as well as loops of small bowel which appear mildly dilated. Findings felt to represent a partial small bowel obstruction. EKG      All EKG's are interpreted by the Emergency Department Physicianwho either signs or Co-signs this chart in the absence of a cardiologist.      PROCEDURES:  PROCEDURE NOTE - CENTRAL VENOUS LINE PLACEMENT    PATIENT NAME: Shana. [id number]  DATE: 9/30/2022  ATTENDING PHYSICIAN: Dr. Robbie Steel DIAGNOSIS:  vascular access, hypovolemia, and centrally administered medications  POSTOPERATIVE DIAGNOSIS:  Same  PROCEDURE PERFORMED:  Right Internal Jugular Vein Central Line Insertion  PERFORMING PHYSICIAN: Alfredo Lopes DO  ANESTHESIA:  Local utilizing 1% lidocaine  ESTIMATED BLOOD LOSS:  Less than 25 ml  COMPLICATIONS:  None immediately appreciated. DISCUSSION:  Irma Carpio is a 76y.o.-year-old female who requires central IV access vascular access, hypovolemia, and central venous monitoring. The history and physical examination were reviewed and confirmed. CONSENT: Unable to be obtained due to patient's condition. PROCEDURE:  A timeout was initiated by the bedside nurse and was confirmed by those present. The patient was placed in a supine position. The skin overlying the Right Internal Jugular Vein was prepped with chlorhexadine and draped in sterile fashion. The skin was infiltrated with local anesthetic. The vessel and surrounding anatomy was visualized using ultrasound. Through the anesthetized region, the introducer needle was inserted into the internal jugular vein returning dark red non pulsatile blood. A guidewire was placed through the center of the needle with no resistance. Ultrasound confirmed presence of wire in the vein. A small incision made in the skin with a #11 scalpel blade.  The dilator was inserted into the skin and vein over guidewire using Seldinger technique. The dilator was then removed and the 7F 20cm catheter was placed in the vein over the guidewire using Seldinger technique. The guidewire was then removed and all ports aspirated and flushed appropriately. The catheter then secured using silk suture and a temporary sterile dressing was applied. No immediate complication was evident. All sponge, instrument and needle counts were correct at the completion of the procedure. Postprocedural chest x-ray showed good position of the catheter with no evidence of hemothorax or pneumothorax. The patient tolerated the procedure well with no immediate complication evident. Irma Barros DO  3:49 AM, 9/30/22     PROCEDURE NOTE - EMERGENCY INTUBATION    PATIENT NAME: [address] [place] [address] RECORD NO. [id number]  DATE: 9/30/2022  ATTENDING PHYSICIAN: Dr. Ibrahima Montero DIAGNOSIS:  Acute Respiratory Failure  POSTOPERATIVE DIAGNOSIS:  Same  PROCEDURE PERFORMED:  Emergency endotracheal intubation  PERFORMING PHYSICIAN: Irma Barros DO    MEDICATIONS: etomidate intravenously and rocuronium intravenously    DISCUSSION:  Annie Gonzales is a 76y.o.-year-old female who requires intubation and ventilatory support due to impending respiratory failure and impending airway compromise. The history and physical examination were reviewed and confirmed. CONSENT: Unable to be obtained due to the emergent nature of this procedure. PROCEDURE:  A timeout was initiated by the bedside nurse and was confirmed by those present. The patient was placed in the appropriate position. Cricoid pressure was not required. Intubation was performed by direct laryngoscopy using a laryngoscope and a 7.5 cuffed endotracheal tube. The cuff was then inflated and the tube was secured appropriately at a distance of 24 cm to the lip.   Initial confirmation of placement included bilateral breath sounds, absence of sounds over the stomach, tube fogging, and adequate chest rise. A chest x-ray to verify correct placement of the tube showed appropriate tube position but near lois, tube pulled back 1 cm. The patient tolerated the procedure well. COMPLICATIONS:  None     Audi Buckley DO  3:49 AM, 9/30/22    PROCEDURE NOTE - ARTERIAL LINE PLACEMENT    PATIENT NAME: Tanmay Caballero [facility] RECORD NO. [id number]  DATE: 9/30/2022  ATTENDING PHYSICIAN:  Dr. Bernadette Stewart DIAGNOSIS:  Need for blood pressure monitoring  POSTOPERATIVE DIAGNOSIS:  Same  PROCEDURE PERFORMED: Right Femoral Arterial Line Insertion  PERFORMING PHYSICIAN:  Kriss Castillo DO  ESTIMATED BLOOD LOSS:  Less than 25 ml  COMPLICATIONS:  None immediately appreciated. DISCUSSION:  Rodo Hope is a 76 y.o. female who requires invasive pressure monitoring. The history and physical examination were reviewed and confirmed. CONSENT: Unable to be obtained due to the emergent nature of this procedure. PROCEDURE:  A timeout was initiated by the bedside nurse and was confirmed by those present. The patient was placed in a supine position. The skin overlying the Right Femoral was prepped with chlorhexadine. Through this region, the introducer needle through catheter was inserted into the right femoral artery until pulsatile bright blood was seen in collection tubing. Guidewire was advanced with no resistance. Catheter was advanced into the artery, wire was pulled with brisk bleeding noted. Pressure monitoring setup was connected to the catheter, it aspirated and flushed easily. The catheter was secured to the wrist with 3-0 silk. No immediate complication was evident. All sponge, instrument and needle counts were correct at the completion of the procedure.       Audi Buckley DO  3:51 AM, 9/30/22      CONSULTS:  IP CONSULT TO NEUROLOGY  IP CONSULT TO GENERAL SURGERY  IP CONSULT TO CRITICAL CARE  PHARMACY TO DOSE VANCOMYCIN    CRITICAL CARE:  Please see attending note    FINAL IMPRESSION      1. Septicemia ([facility] [address]    2. Hypothermia, initial encounter    3. Pressure injury of skin, unspecified injury stage, unspecified location          DISPOSITION / [address] Admitted 09/29/2022 11:27:56 PM      PATIENT REFERRED TO:  No follow-up provider specified.     DISCHARGE MEDICATIONS:  Current Discharge Medication List          Demi Florentino DO  Emergency Medicine Resident    (Please note that portions of this note were completed with a voice recognition program.Efforts were made to edit the dictations but occasionally words are mis-transcribed.)       Demi Florentino DO  Resident  09/30/22 [address] [facility] DO [place]  Resident  09/30/22 1951

## 2022-09-30 PROBLEM — A41.9 SEPTICEMIA (HCC): Status: ACTIVE | Noted: 2022-01-01

## 2022-09-30 PROBLEM — J96.02 ACUTE RESPIRATORY FAILURE WITH HYPOXIA AND HYPERCAPNIA (HCC): Status: ACTIVE | Noted: 2022-01-01

## 2022-09-30 PROBLEM — J96.01 ACUTE RESPIRATORY FAILURE WITH HYPOXIA AND HYPERCAPNIA (HCC): Status: ACTIVE | Noted: 2022-01-01

## 2022-09-30 PROBLEM — A41.9 SEPTIC SHOCK (HCC): Status: ACTIVE | Noted: 2022-01-01

## 2022-09-30 PROBLEM — G93.40 ENCEPHALOPATHY: Status: ACTIVE | Noted: 2022-01-01

## 2022-09-30 PROBLEM — R65.21 SEPTIC SHOCK (HCC): Status: ACTIVE | Noted: 2022-01-01

## 2022-09-30 NOTE — PROGRESS NOTES
Comprehensive Nutrition Assessment    Type and Reason for Visit:  Initial (vent)    Nutrition Recommendations/Plan:   Start nutrition as able/as warranted; will provide EN/PN recommendations at that time. Malnutrition Assessment:  Malnutrition Status:  Insufficient data (09/30/22 1434)  '    Nutrition Assessment:    76 y.o. female who presents as a transfer from [facility] [place] where she was brought after being found down for unknown amount of time. Pt was found down with altered mental status, multiple pressure sores, covered in feces and maggots. PMH includes: DM, GERD, HLD, HTN, Obesity, Cellulitis. Pt is currently intubated. No nutrition at present. Meds/labs reviewed. Nutrition Related Findings:    meds/labs reviewed Wound Type: Multiple, Pressure Injury       Current Nutrition Intake & Therapies:    Diet NPO    Anthropometric Measures:  Height: 5' 10\" (177.8 cm)  Ideal Body Weight (IBW): 150 lbs (68 kg)       Current Body Weight: 172 lb 6.4 oz (78.2 kg), 114.9 % IBW. Weight Source: Bed Scale  Current BMI (kg/m2): 24.7                          BMI Categories: Normal Weight (BMI 18.5-24. 9)    Estimated Daily Nutrient Needs:  Energy Requirements Based On: Kcal/kg  Weight Used for Energy Requirements: Current  Energy (kcal/day): 6636-9690 kcal/day  Weight Used for Protein Requirements: Current  Protein (g/day):  g pro/day  Method Used for Fluid Requirements: Other (Comment)  Fluid (ml/day): per physician    Nutrition Diagnosis:   Inadequate oral intake related to impaired respiratory function as evidenced by NPO or clear liquid status due to medical condition    Nutrition Interventions:   Food and/or Nutrient Delivery: Start nutrition as able/as warranted; will provide EN/PN recommendations at that time.   Nutrition Education/Counseling: No recommendation at this time  Coordination of Nutrition Care: Continue to monitor while inpatient       Goals:     Goals: Meet at least 75% of estimated needs Nutrition Monitoring and Evaluation:   Behavioral-Environmental Outcomes: None Identified  Food/Nutrient Intake Outcomes: Diet Advancement/Tolerance  Physical Signs/Symptoms Outcomes: Biochemical Data, Nutrition Focused Physical Findings, Skin, Weight, Fluid Status or Edema, Hemodynamic Status    Discharge Planning:     Too soon to determine     [address], LD  Contact: [phone]

## 2022-09-30 NOTE — CARE COORDINATION
Transitional Planning  Per Nursing rounds pt's spouse Haven Corporal in [address] with legal guardian and his daughter Bria Mack is unable to make healthcare decisions.  spoke with pt's dtr Praveena Shine who confirmed to  and Dr Ted Dia that she relinquishes decisional rights for pt.  also contacted pt's niece Sherice Brothers and she text she is unwilling to be decisional maker.  then connected with pt's grandson Blaise [phone] who confirmed he is willing to make health care decisions for pt.   Pt was made [facility]

## 2022-09-30 NOTE — ED NOTES
Pt vomit x1, desat O2 at 75%. Dr. Amada Kang and  Dr. Mortimer Musca at bedside.         Maye Rincon RN  09/29/22 0632

## 2022-09-30 NOTE — PROGRESS NOTES
[address] [address] Continence Nurse  Consult Note       NAME:  Tanmay Hall RECORD NUMBER:  [id number]  AGE: 76 y.o. GENDER: female  : 1945  TODAY'S DATE:  2022    Subjective:     Reason for [facility] Evaluation and Assessment: \"pressure sores\"      Christie Croft is a 76 y.o. female referred by:   [x] Physician  [] Nursing  [] Other:     Wound Identification:  Wound Type: pressure  Contributing Factors: chronic pressure, decreased mobility, shear force, arterial insufficiency, decreased tissue oxygenation, incontinence of stool, and incontinence of urine        Found down at home. Right lateral side pressure injuries. Critically ill. On multiple pressors at maximum doses and remains hypotensive. Extremities are mottled. Objective:      BP (!) 80/13   Pulse (!) 111   Temp (!) 96.3 °F (35.7 °C)   Resp 30   Wt 172 lb 6.4 oz (78.2 kg)   SpO2 95%   BMI 24.74 kg/m²   Timmy Risk Score: Timmy Scale Score: 9    LABS    CBC:   Lab Results   Component Value Date/Time    WBC 13.7 2022 04:39 AM    RBC 5.77 2022 04:39 AM    HGB 18.2 2022 04:39 AM     CMP:  Albumin:    Lab Results   Component Value Date/Time    LABALBU 1.9 2022 08:53 PM     PT/INR:  No results found for: PROTIME, INR  HgBA1c:    Lab Results   Component Value Date/Time    LABA1C 6.1 2022 03:43 PM     PTT: No components found for: LABPTT      Assessment:       Measurements:  Wound 22 Thigh Anterior;Proximal;Right pressure wound (Active)   Wound Image   22 1348   Wound Etiology Deep tissue/Injury 22 1348   Wound Cleansed Soap and water 22 0400   Dressing/Treatment Open to air 22 0800   Wound Assessment Ruptured blister; Purple/maroon;Subcutaneous; Devitalized tissue 22 1348   Number of days: 0       Wound 22 Shoulder Right; Anterior (Active)   Wound Image   22 1348   Wound Etiology Pressure Unstageable 22 1348   Wound Cleansed Soap and water 22 0400 Dressing/Treatment Open to air 09/30/22 0800   Wound Assessment Eschar dry;Subcutaneous 09/30/22 1348   Number of days: 0       Wound 09/30/22 Head Right;Upper (Active)   Wound Image   09/30/22 1348   Wound Etiology Pressure Unstageable 09/30/22 1348   Wound Cleansed Soap and water 09/30/22 0400   Dressing/Treatment Open to air 09/30/22 0800   Wound Assessment Eschar dry;Subcutaneous 09/30/22 1348   Number of days: 0       Wound 09/30/22 Knee Right;Lateral (Active)   Wound Image   09/30/22 1348   Wound Etiology Deep tissue/Injury 09/30/22 1348   Wound Assessment Purple/maroon 09/30/22 1348   Number of days: 0       Wound 09/30/22 Elbow Right (Active)   Wound Image   09/30/22 1348   Wound Etiology Pressure Unstageable 09/30/22 1348   Wound Assessment Subcutaneous;Eschar dry 09/30/22 1348   Number of days: 0      Right ear dusky and edematous. Plan:     Plan of Care: Turn every 2 hours  Float heels off of bed with pillows under calves    Use lift sling to reposition patient to minimize potential for shear injury. Foam sacrum dressing to sacrococcygeal area. Peel back dressing, inspect skin beneath, re-secure. Change every 72 hours and prn wrinkles, soilage. Discontinue Sacral dressing if repeatedly soiled by incontinence. Routine incontinence care with incontinence barrier cloths and zinc oxide cream. Apply zinc oxide cream BID and prn incontinence. Moisture wicking under pads   Trauma service has recommended Santyl collagenase ointment to offer enzymatic debridement of the necrotic right sided wounds. This will require an order from the physician. NS moistened gauze under ABD pads to the rt shoulder, elbow and hip. Cover with ABD pads. Change BID. The Santyl collagenase, if ordered, may be applied to the necrotic tissue and then the NS moistened gauze dressings will be applied over this. Santyl requires moisture to be effective.            Specialty Bed Required : Yes: Isolibrium in use [x] Low Air Loss   [x] Pressure Redistribution  [] Fluid Immersion  [] Bariatric  [] Total Pressure Relief  [] Other:     Discharge Plan:  TBD    Patient/Caregiver Teaching:    [] Indicates understanding       [] Needs reinforcement  [] Unsuccessful      [] Verbal Understanding  [] Demonstrated understanding       [x] No evidence of learning  [] Refused teaching         [] N/A    Contact the Wound Ostomy RN on-call Monday-Friday [phone] via [employer] by searching \"wound\" under \"groups\" and selecting the on-call clinician.         Electronically signed by Pedrito Ariza RN, [facility] on 9/30/2022 at 1:51 PM

## 2022-09-30 NOTE — PROGRESS NOTES
Patient admitted on Mechanical Ventilator Protocol. Patients height measured at 68 for an     Patient placed on the ventilator on settings as charted on flowsheeet. Ventilator Bronchodilator assessment    Breath sounds: Dimnished  Inspiratory Pressure: 29  Plateau Pressure: 24    Patient assessed at level 8          []    Bronchodilator Assessment    BRONCHODILATOR ASSESSMENT SCORE  Score 0 (Home) 1 2 3 4   Breath Sounds   []  Chronic Ventilator: Patient at baseline [x]  Mild Wheezes/ Clear []  Intermittent wheezes with good air entry []  Bilateral/unilateral wheezing with diminished air entry []  Insp/Exp wheeze and/or poor aeration   Ventilator Pressures   []  Chronic Ventilator []  Insp. Pressure less than 25 cm H20 []  Insp. Pressure less than 25 cm H20 [x]  Insp. Pressure exceeds 25 cm H20 []  Insp.  Pressure exceeds 30 cm H20   Plateau Pressure []  NA   []  Plateau Pressure less than 4  []  Plateau Pressure less than or equal to 5 []  Plateau Pressure greater than or equal to 6 [x]  Plateau Pressure greater than or equal to 8       Marisela Trotter  2:04 AM

## 2022-09-30 NOTE — PROGRESS NOTES
Physical Therapy Cancel Note      DATE: 2022    NAME: Ángel Hewitt  MRN: [id number]   : 1945      Patient not seen this date for Physical Therapy due to:    Patient is not appropriate for PT evaluation/treatment at this time d/t medical status; continue to monitor and evaluate when appropriate; likely hold PT eval until Monday, Oct 3      Electronically signed by Janneth Bearden PT on 2022 at 9:11 AM

## 2022-09-30 NOTE — PLAN OF CARE
Problem: Discharge Planning  Goal: Discharge to home or other facility with appropriate resources  Outcome: Progressing     Problem: Pain  Goal: Verbalizes/displays adequate comfort level or baseline comfort level  Outcome: Progressing     Problem: Chronic Conditions and Co-morbidities  Goal: Patient's chronic conditions and co-morbidity symptoms are monitored and maintained or improved  Outcome: Progressing     Problem: Respiratory - Adult  Goal: Achieves optimal ventilation and oxygenation  Outcome: Progressing     Problem: Metabolic/Fluid and Electrolytes - Adult  Goal: Electrolytes maintained within normal limits  Outcome: Progressing     Problem: Skin/Tissue Integrity - Adult  Goal: Skin integrity remains intact  Outcome: Progressing

## 2022-09-30 NOTE — CONSULTS
General Surgery  Consult    PATIENT NAME: Wei Steel  AGE: 76 y.o. MEDICAL RECORD NO. [id number]  DATE: 9/29/2022  SURGEON: Manuel Rodriguez MD  PRIMARY CARE PHYSICIAN: Christene Dubin, DO    Patient evaluated at the request of  Dr. Hong Swain MD  Reason for evaluation: Pressure wounds    Patient information was obtained from EMS notes, nurses and ED physician. History/Exam limitations: mental status and confusion. IMPRESSION:     Patient Active Problem List   Diagnosis    AMMON (obstructive sleep apnea)    Hypertension    Type 2 diabetes mellitus without complication (HCC)    Class 1 obesity due to excess calories with serious comorbidity and body mass index (BMI) of 34.0 to 34.9 in adult    Insomnia    Frequent headaches    Leg swelling    Stroke-like symptoms    Impaired gait    Left breast mass    Right upper lobe pulmonary nodule    Expressive aphasia    Cellulitis of lower extremity   Multiple wounds      PLAN:   Debridement with sentyl IR applications and wound consult. HISTORY:   History of Chief Complaint:    Wei Steel is a 76 y.o. female who presents with found down after 1 week. Symptom onset has been acute for a time period of 1 week(s). Severity is described as severe. Course of her symptoms over time is constant. Past Medical History   has a past medical history of Acute cystitis, Cancer ([address] Chronic respiratory acidosis, Diabetes mellitus ([address] GERD (gastroesophageal reflux disease), Hypercapnia, Hyperlipidemia, Hypertension, Left leg cellulitis, and Obesity hypoventilation syndrome ([address] Past Surgical History   has a past surgical history that includes Cholecystectomy; Appendectomy; and Colon surgery. Medications  Prior to Admission medications    Medication Sig Start Date End Date Taking?  Authorizing Provider   aspirin 81 MG EC tablet Take 1 tablet by mouth daily 7/15/22   Yamilex Platt MD   Multiple Vitamins-Minerals (THERAPEUTIC MULTIVITAMIN-MINERALS) tablet Take 1 right shoulder, cm wound right elbow, 10 cm decubitis sacral ulcer,   NEUROLOGIC: CN II-XII are grossly intact. There are no focalizing motor or sensory deficits  EXTREMITIES: no cyanosis, clubbing or edema    LABS:   No results for input(s): WBC, HGB, HCT, PLT, NA, K, CL, CO2, BUN, CREATININE, MG, PHOS, CALCIUM, PTT, INR, AST, ALT, BILITOT, BILIDIR, NITRU, COLORU, BACTERIA in the last 72 hours. Invalid input(s): PT, WBCU, RBCU, LEUKOCYTESUA  No results for input(s): ALKPHOS, ALT, AST, BILITOT, BILIDIR, LABALBU, AMYLASE, LIPASE in the last 72 hours. RADIOLOGY:     XR SHOULDER RIGHT (MIN 2 VIEWS)    Result Date: 9/29/2022  EXAMINATION: THREE XRAY VIEWS OF THE RIGHT SHOULDER 9/29/2022 9:39 pm COMPARISON: 09/29/2022 HISTORY: ORDERING SYSTEM PROVIDED HISTORY: deep pressure wound TECHNOLOGIST PROVIDED HISTORY: deep pressure wound Reason for Exam: found down,large sore on shoulder FINDINGS: Degenerative changes are seen within the spine, involving the thoracic spine as well as the cervical spine. Degenerative changes noted in the glenohumeral and acromioclavicular joints. No fracture or dislocation is identified. No osseous destructive process is seen. An IV catheter is seen in the upper arm. No radiopaque foreign body. Soft tissue lucency overlying the upper arm. Patchy infiltrates seen within the lungs. 1. No acute osseous fracture or dislocation. 2. Large soft tissue lucency related to the given history of wound overlying the right proximal shoulder. 3. No osteomyelitis. 4. Patchy infiltrates seen throughout the lungs. XR CHEST PORTABLE    Result Date: 9/30/2022  EXAMINATION: ONE XRAY VIEW OF THE CHEST 9/29/2022 11:20 pm COMPARISON: 09/29/2022 HISTORY: ORDERING SYSTEM PROVIDED HISTORY: line placement TECHNOLOGIST PROVIDED HISTORY: line placement FINDINGS: There is a right IJ central venous catheter which terminates in the lower aspect of the right atrium. No pneumothorax is identified.   Cardiac silhouette is enlarged. Patchy infiltrates are seen within the lungs bilaterally. No acute osseous abnormality. Degenerative changes seen throughout the spine and left shoulder. Multifocal infiltrates seen throughout the lungs bilaterally suspicious for pneumonia, with a small right-sided pleural effusion. Right IJ central venous catheter terminates low within the right atrium. The cavoatrial junction is approximately 5 cm proximal from the catheters current position. No pneumothorax. Thank you for the interesting evaluation. Further recommendations to follow.     Lory Casillas DO  9/29/2022, 9:06 PM

## 2022-09-30 NOTE — PROGRESS NOTES
[facility] Pharmacokinetic Monitoring Service - Vancomycin     Shawna Borja is a 76 y.o. female starting on vancomycin therapy for Pneumonia. Pharmacy consulted by Cristino Mario for monitoring and adjustment. Target Concentration: Goal AUC/MARY 400-600 mg*hr/L    Additional Antimicrobials: Zosyn    Pertinent Laboratory Values: Wt Readings from Last 1 Encounters:   09/29/22 195 lb (88.5 kg)     Temp Readings from Last 1 Encounters:   09/29/22 (!) 89.2 °F (31.8 °C)     Estimated Creatinine Clearance: 58 mL/min (A) (based on SCr of 0.95 mg/dL (H)). Recent Labs     09/29/22 2053 09/30/22  0041   CREATININE 1.41* 0.95*   WBC 12.6*  --      Procalcitonin:     Pertinent Cultures:  Culture Date Source Results        MRSA Nasal Swab: not ordered. Order placed by pharmacy.     Plan:  Dosing recommendations based on Bayesian software  Start vancomycin 1250 mg IV every 24 hours  Anticipated AUC of 427 and trough concentration of 12.7 at steady state  Renal labs as indicated      Pharmacy will continue to monitor patient and adjust therapy as indicated    Thank you for the consult,  Fidel Nick, [facility]  9/30/2022 1:43 AM

## 2022-09-30 NOTE — PROGRESS NOTES
DEATH NOTE    PATIENT NAME: Sole Jose  YOB: 1945  MEDICAL RECORD NO. [id number]  DATE: 9/30/2022  PRIMARY CARE PHYSICIAN: Lyndon Stover DO    DIAGNOSIS OF DEATH     I have confirmed the death of this patient in accordance with accepted medical standards. The patient is dead as evidenced by cardiac death or cessation of brain function:    Cardiac Death (check all that apply):     [x]  Absence of respiratory effort by observation     [x]  Absence of pulse by palpation     []  Absence of blood pressure by sphygmomanometry     [x]  Absence of sustainable cardiac rhythm by monitor    Death by Cessation of Brain Function (check all that apply):     []  Absence of Cerebral Function with no motor response     []  Absence of brain stem function by systemic physical exam     []  Failure to respond with respiratory drive by apnea test     []  Cerebral Electrical silence as interpreted by qualified reader        OR     []  Absence of brain blood flow by radiologic technique      CERTIFICATION OF DEATH     I have pronounced the patient dead on:     Date: 9/30/2022 at 16:51 PM     NOTIFICATIONS     Attending physician that will sign Death Certificate: Dr. Jenna Santoro MD  Critical Care  PGY-3, Internal Medicine Resident  [address]  9/30/2022 4:57 PM

## 2022-09-30 NOTE — PROGRESS NOTES
PROGRESS NOTE          PATIENT NAME: [facility] RECORD NO. [id number]  DATE: 2022  SURGEON: Dr. Chris Goodwin: Joel Douglas DO    HD: # 1    ASSESSMENT    Patient Active Problem List   Diagnosis    AMMON (obstructive sleep apnea)    Hypertension    Type 2 diabetes mellitus without complication (HCC)    Class 1 obesity due to excess calories with serious comorbidity and body mass index (BMI) of 34.0 to 34.9 in adult    Insomnia    Frequent headaches    Leg swelling    Stroke-like symptoms    Impaired gait    Left breast mass    Right upper lobe pulmonary nodule    Expressive aphasia    Cellulitis of lower extremity    Hypernatremia       MEDICAL DECISION MAKING AND PLAN    Consulted for wounds. Recommed santyl and wound care consult. No wound to coccyx. Recommend palliative care consult. Surgery to sign off. SUBJECTIVE    [facility] was seen at bedside. Patient intubated. On vasopressor support and SBP noted to be in the 60s. Nursing states family is attempting to be contacted as there are complicated dynamics. However, they did locate a family member willing to make medical decisions and made patient [facility]. OBJECTIVE  VITALS: Temp: Temp: (!) 96.3 °F (35.7 °C)Temp  Av.2 °F (34.6 °C)  Min: 89.2 °F (31.8 °C)  Max: 97.3 °F (57.0 °C) BP Systolic (13RDT), RFZ:04 , Min:77 , AKM:884   Diastolic (91GDF), JJI:48, Min:13, Max:87   Pulse Pulse  Av.9  Min: 72  Max: 131 Resp Resp  Av.6  Min: 14  Max: 30 Pulse ox SpO2  Av.6 %  Min: 65 %  Max: 98 %  Intubated-resp per mechanical vent  Pressure sores per pictures  Malodorous  Hypotensive  Normal cardiac rate. I/O last 3 completed shifts:  In: -   Out: 10 [Urine:10]    Drain/tube output:   In: 3673.2 [I.V.:2794.2; NG/GT:300]  Out: 145 [Urine:25]    LAB:  CBC:   Recent Labs     22  0439   WBC 12.6* 13.7*   HGB 17.2* 18.2*   HCT 49.2* 57.0*   MCV 93.4 98.8    Platelet clumps present, count appears adequate. BMP:   Recent Labs     09/30/22  0439 09/30/22  0537 09/30/22  0922   * 154* 160*   K 6.1* 6.2* 5.7*   * 122* 124*   CO2 18* 16* 11*   BUN 85* 85* 83*   CREATININE 1.83* 1.91* 2.15*   GLUCOSE 114* 96 103*     COAGS:   Recent Labs     09/29/22  2053 09/30/22  1100   PROT 4.9* 3.1*       RADIOLOGY:  CXR: 9/30:  Impression   Multifocal infiltrates seen throughout the lungs bilaterally suspicious for   pneumonia, with a small right-sided pleural effusion. Right IJ central venous catheter terminates low within the right atrium. The   cavoatrial junction is approximately 5 cm proximal from the catheters current   position. No pneumothorax.              Citlali Jim, APRN - CNP  9/30/22, 12:51 PM

## 2022-09-30 NOTE — ED NOTES
Pt to ED from [facility] due to septic work up and wound care. Pt has a multiple pressure ulcer on bony area of her entire body. Noted at Posterior part of right shoulder, right hip and multiple scabs, scars. Pt has an on going IV fluid of Saline 1L at 450ml, Levophed at 0.075mcg/min. Pt GCS 13 upon arrival per report. Will cont plan of care.      Sourav Garrett RN  09/30/22 0040

## 2022-09-30 NOTE — PROGRESS NOTES
responded to call from third shift  and responded to patient's room to assist/continue to contact patient's family. (See previous  note)     spoke with Dr. Rodger Calvert regarding decisional conflict and attempts to contact next of kin.  learned to first confirm spouse has guardian and is unable to make decisions for patient, then make every attempt to contact patient's daughter. Following, confirm patient has no other siblings outside of known brother Ganesh Guzman). At that point, given brother declined to two chaplains should decisional rights fall to him, patient's niece or other relatives can be legal decision makers with no majority requirement.  reached [address] [place] [address] in [place], [place] ([phone]) and spoke with Nina eLdezma and Chris Villareal () who both confirmed that patient's spouse, Olu Slaughter, has a legal guardian (his daughter Sherrie Dias) and is unable to make healthcare decisions for patient.  spoke with patient's daughter, Jesus Alberto Stapleton ([phone]) who confirmed to  and to physician (Dr. Harshal Trotter) that she relinquishes decisional rights for pt.  confirmed with patient's niece, Chaparrita Later, that patient has no other siblings. , with third shift , heard patient's brother, Patt Ding, decline decisional rights for pt.  attempted to call patient's niece, Chaparrita Later, to connect her (as new potential decisional contact) with physician (Dr. Harshal Trotter).  attempted 5 times between 0930 and 0955; voicemail box full, no message left.  sent text from primary phone requesting call back. Patient's niece responded via text message that she is unwilling to be decisional contact.  called patient's grandson, Holger Rock [facility] - [phone]), who confirmed he is willing to make health care decisions for patient.  connected Catina with Dr. Harshal Trotter.    heard from patient's Catina dover, at [place] alongside Dr. Cammie Marx, that he DOES NOT want CPR performed should patient's heart stop.  witnessed code status change form to [facility].       09/30/22 0941   Encounter Summary   Service Provided For: Patient; Family   Referral/Consult From: Physician; Other    Support System Family members   Last Encounter  09/30/22   Complexity of Encounter High   Begin Time 0800   End Time  1000   Total Time Calculated 120 min   Crisis   Type Relationship concerns   Assessment/Intervention/Outcome   Assessment Decisional conflict   Intervention Active listening

## 2022-09-30 NOTE — PROGRESS NOTES
Occupational [address]  Occupational Therapy Not Seen Note    DATE: 2022    NAME: Catia Newton  MRN: [id number]   : 1945      Patient not seen this date for Occupational Therapy due to:    Patient is not appropriate for active participation in OT evaluation/treatment at this time d/t intubated/sedated.      Next Scheduled Treatment: Check 10/3/2022    Electronically signed by Trino Feliciano OT on 2022 at 12:06 PM

## 2022-09-30 NOTE — PROGRESS NOTES
Pharmacy Note     Renal Dose Adjustment    Jones Trejo is a 76 y.o. female. Pharmacist assessment of renally cleared medications. Recent Labs     09/29/22 2053 09/30/22  0041   BUN 93* 61*       Recent Labs     09/29/22 2053 09/30/22  0041   CREATININE 1.41* 0.95*       Estimated Creatinine Clearance: 58 mL/min (A) (based on SCr of 0.95 mg/dL (H)). Estimated CrCl using Ideal Body Weight: 52 mL/min (based on IBW 68.5 kg)    Height:   Ht Readings from Last 1 Encounters:   07/08/22 5' 10\" (1.778 m)     Weight:  Wt Readings from Last 1 Encounters:   09/29/22 195 lb (88.5 kg)       The following medication dose has been adjusted based upon renal function per P&T Guidelines:             Zosyn dose changed from 3.375 gm IV every 12 hours to 3.375 gm IV every 8 hours. Jimmy Loera Pharm. D.    9/30/2022  2:01 AM

## 2022-09-30 NOTE — PROGRESS NOTES
[facility]   Patient Death Note  DEATH   Shift date: 22    Shift day: Friday  Shift #: 2                 Room # 9023/8100-00   Name: Rhonda Cesar            Age: 76 y.o. Gender: female          Pentecostalism: None      Place of [facility]: Unknown  Admit Date & Time: 2022  7:15 PM     Referral: Nurse   Actual date of death: 22   TOD: 16:51PM      SITUATION AT DEATH:  Sepsis, Cardiac Arrest.    IS THIS A 'S CASE? Yes    SPIRITUAL/EMOTIONAL INTERVENTION:   followed up with perfect serve notification at 4:55pm on 22. Patient had already passed away.  spoke with the grandson named Jett Fernandez. Jennifer Cocks will let Spiritual Care know which [facility] they have chosen. This is a 's case though. Family Received Grief Packet? No       NAME AND PHONE NUMBER OF DOCTOR SIGNING DEATH CERTIFICATE: 's Office     Family will notify Spiritual Care of the [facility] chosen. Copy of COMPLETED Release of Body Form Received? Yes    Patient's belongings: [address]:  [facility]  [place] [facility]  Phone Number: TBD     NEXT OF KIN:  Name: Jett Fernandez  Relationship: Grandson  Street Address: [address]: [facility]  Zip code: [address]  Phone Number: [phone]    [facility]? Yes    IF SO, WHAT? Patient's grandson named Jett Fernandez will call on 10/1/22 to give  home. This is a 's case though.      Electronically signed by Kimber Jensen Resident, on 2022 at 5:51 PM.  [facility]  [phone]

## 2022-09-30 NOTE — H&P
Critical Care - History and Physical Examination    Patient's name:  Ana Lorenzo  Medical Record Number: [id number]  Patient's account/billing number: [de-identified]  Patient's YOB: 1945  Age: 76 y.o. Date of Admission: 9/29/2022  7:15 PM  Reason of ICU admission: AMS, Hypotension   Date of History and Physical Examination: 9/30/2022      Primary Care Physician: Fracisco Sanchez DO  Attending Physician: Elmer Palomino     Code Status: Full Code    Chief complaint: AMS     Reason for ICU admission:   AMS  Hypotension  Severe Dehydration   Hypothermia   Hypernatremia   Sepsis     History Of Present Illness:   History was obtained from chart review. Ana Lorenzo is a 76 y.o.  female who presents as a transfer from [facility] where she was brought after being found down for unknown amount of time. According to EMS, the patient's significant other is in a long-term care facility and she is living independently. She was found down with altered mental status, multiple pressure sores, covered in feces and maggots. She has a large and deep pressure sore on the right shoulder, including in few other areas in the right side of the body. Patient unable to provide much more history. Septic work-up was initiated at the other facility. CT of the head was negative. She was started on Zosyn, fluids and required Levophed. On nasal cannula oxygen on arrival.  Patient was hypothermic and temperature was increased to 98 prior to transfer. Neruo/Stroke team consulted in the ED for AMS, pt had NIH of 20, CT head unremarkable. Patient was hypothermic with temp of 31.8, placed on bearhugger. Was found to be severely dehydrated with Na of 158, BUN/Cr 93/1. 41. Lactic acid was 3.1,  and CK was 643. R IJ was placed in the emergency department due to levophed requirements. Patient was increasingly altered and then had a large volume episode of coffee ground emesis. Decision was made to intubate for airway protection. On my evaluation patient was maintaing her pressures on Levo, sedated, multiple deep wounds noted to right head, shoulder and hip. Patient had received 3L fluid resuscitation at this time but remained anuric. Gen surg consulted for the wounds. Past Medical History:        Diagnosis Date    Acute cystitis 12/2021    Cancer [facility])     colon    Chronic respiratory acidosis 2018    Diabetes mellitus (HCC)     GERD (gastroesophageal reflux disease)     Hypercapnia 2018    Hyperlipidemia     Hypertension     Left leg cellulitis 12/2021    Obesity hypoventilation syndrome ([address] 2018       Past Surgical History:        Procedure Laterality Date    APPENDECTOMY      CHOLECYSTECTOMY      COLON SURGERY      for colon cancer       Allergies:    No Known Allergies      Home Medications:   Prior to Admission medications    Medication Sig Start Date End Date Taking? Authorizing Provider   aspirin 81 MG EC tablet Take 1 tablet by mouth daily 7/15/22   Kasie Calderon MD   Multiple Vitamins-Minerals (THERAPEUTIC MULTIVITAMIN-MINERALS) tablet Take 1 tablet by mouth daily    Historical Provider, MD   melatonin 3 MG TABS tablet Take 3 mg by mouth nightly as needed    Historical Provider, MD       Social History:   TOBACCO:   reports that she has never smoked. She has never used smokeless tobacco.  ETOH:   reports no history of alcohol use. DRUGS:  reports no history of drug use.   OCCUPATION:      Family History:       Problem Relation Age of Onset    Alzheimer's Disease Mother     Heart Failure Father     Lung Cancer Sister     Brain Cancer Brother            REVIEW OF SYSTEMS (ROS):  Unable to obtain       Physical Exam:    Vitals: BP (!) 77/53   Pulse (!) 109   Temp (!) 89.2 °F (31.8 °C) Comment (Src): Temperature sensing catherter  Resp 24   Wt 195 lb (88.5 kg)   SpO2 98%   BMI 27.98 kg/m²     Body weight:   Wt Readings from Last 3 Encounters:   09/29/22 195 lb (88.5 kg)   07/14/22 195 lb 1.6 oz (88.5 kg) enzymes:  Recent Labs     09/29/22  2215   CKTOTAL 502*     BNP:No results for input(s): BNP in the last 72 hours. Lipid profile: No results for input(s): CHOL, TRIG, HDL, LDL, LDLCALC in the last 72 hours. Blood Gases: No results found for: PH, PCO2, PO2, HCO3, O2SAT  Thyroid functions:   Lab Results   Component Value Date/Time    TSH 1.82 07/08/2022 03:43 PM        Urinalysis:     Microbiology:  Cultures during this admission:     Blood cultures:                 [] None drawn      [x] Negative             []  Positive (Details:  )  Urine Culture:                   [] None drawn      [x] Negative             []  Positive (Details:  )  Sputum Culture:               [x] None drawn       [] Negative             []  Positive (Details:  )   Endotracheal aspirate:     [x] None drawn       [] Negative             []  Positive (Details:  )         -----------------------------------------------------------------  Radiological reports:  XR ABDOMEN FOR NG/OG/NE TUBE PLACEMENT   Final Result   1. ET tube is 3.9 cm above the lois. 2. Enteric catheter tip terminates in the proximal gastric body. 3. Bilateral perihilar and right basilar infiltrates with a small to moderate   right effusion, suspicious for multilobar pneumonia. 4. Gaseous distention of the stomach, as well as loops of small bowel which   appear mildly dilated. Findings felt to represent a partial small bowel   obstruction. XR CHEST PORTABLE   Final Result   1. ET tube is 3.9 cm above the lois. 2. Enteric catheter tip terminates in the proximal gastric body. 3. Bilateral perihilar and right basilar infiltrates with a small to moderate   right effusion, suspicious for multilobar pneumonia. 4. Gaseous distention of the stomach, as well as loops of small bowel which   appear mildly dilated. Findings felt to represent a partial small bowel   obstruction.          XR CHEST PORTABLE   Final Result   Multifocal infiltrates seen throughout the lungs bilaterally suspicious for   pneumonia, with a small right-sided pleural effusion. Right IJ central venous catheter terminates low within the right atrium. The   cavoatrial junction is approximately 5 cm proximal from the catheters current   position. No pneumothorax. XR SHOULDER RIGHT (MIN 2 VIEWS)   Final Result   1. No acute osseous fracture or dislocation. 2. Large soft tissue lucency related to the given history of wound overlying   the right proximal shoulder. 3. No osteomyelitis. 4. Patchy infiltrates seen throughout the lungs.          MRI BRAIN WO CONTRAST    (Results Pending)   MRA HEAD WO CONTRAST    (Results Pending)   MRA NECK WO CONTRAST    (Results Pending)            Assessment and Plan     Patient Active Problem List   Diagnosis    AMMON (obstructive sleep apnea)    Hypertension    Type 2 diabetes mellitus without complication (HCC)    Class 1 obesity due to excess calories with serious comorbidity and body mass index (BMI) of 34.0 to 34.9 in adult    Insomnia    Frequent headaches    Leg swelling    Stroke-like symptoms    Impaired gait    Left breast mass    Right upper lobe pulmonary nodule    Expressive aphasia    Cellulitis of lower extremity    Hypernatremia         Additional assessment:  Pneumonia   Dehydration   Septicemia       Plan:  Neuro:  Initially presented with AMS, Stroke consult pageeun, neurology following   Plan For MRI/MRA 9/30   Intubated and sedated on propofol     Resp:  Concern for pneumonia   Started on vanc and zosyn   Intubated and sedated   PRVC 30/390/12/100%  ABG 7.04/65.7/143/17.8/14    Vent Information  Ventilator ID: [email or web address]  Equipment Changed: Vent Circuit, Suction catheter, HME, Expiratory Filter, Airway securing device    CV:  Septic Shock   Hypotensive requiring pressure support   Levo @45  Vaso @ 30   MAP goal >65   Tachycardic - repeat EKG   Troponin's uptrending 23 > 44    GI/Nutrition:  NG tube in place to LIWS   CXR concerning for SBO   NPO   Coffee ground emesis prior to intubation   Given protonix bolus and started on Gtt   Likely consult GI today     /Fluids/Electrolytes:  Severe dehydration, anuric   Strict I/O   4L fluid resus at this time   Na 158 > 155 > 157   Potassium 3.0 > 1.9 > 6.1   Only given 40mg of K   2 amps of bicarb given   Bicarb 13 > 22 > 17   Hypercholermic   BUN/Cr 85/1.83   Concern for Rhabdo -    Nephrology consulted- appreciate recommendations     Heme:  Hemoconcentrated likely due to dehydration   H&H 17.2/49.2   Plt 213   Heparin subq for DVT ppx   Frequent CBC checks     ID:  Septicemia, pneumonia, extensive body wounds   Initial WBC 12.6 > 13.6   Lactic acid 3.1 > 5.33   Started on vanc and zosyn   Blood cultures and urine cultures ordered - follow up     Endo:  BG controlled at this time     MSK:  PT/OT     Skin:  Extensive wounds found likely from prolonged down time. General surgery consulted for wound management     Prophylaxis:  DVT: heparin SubQ   GI: Protonix gtt     Dispo:   Icu for further management and stabilization         Pema Blanco DO   Internal Medicine - PGY-3  Intensive Care Unit  9/30/2022, 2:15 AM

## 2022-09-30 NOTE — ED NOTES
77 yo Jazzy Tez full code, found down today, alert but confused, unknown last known well, extensive wounds especially the right shoulder, 15 x 15 cm and 5 cm deep, leukocytosis to 14, lactate 4, hypothermic to 87 Fahrenheit, receiving rewarming with Lizzy hugger, creatinine 1.7, troponin 0.87, hypernatremic to 155, hypokalemic 3.2, hemoconcentrated 21, received 1 L IV fluids, getting second liter, vancomycin, Zosyn, CT head neck face negative, right side infiltrate seen on imaging       Susan Ott, RN  09/29/22 7841

## 2022-09-30 NOTE — PROGRESS NOTES
Date: 9/29/2022  Time: 2334  Patient identity confirmed:  Yes  Indications: Airway protection  Preoxygenation: yes    Laryngoscope size and type Glidescope  Airway introducer used: No  Evac: No  ETT size:a 7.5 cuffed  Number of attempts:1   Cords visualized:  [x] Clearly  [] Poorly  Breath sounds present bilaterally: Yes   ETCO2   [x] Positive   ETT secured at  23  at the lips  ETT secured with blake  Chest x-ray ordered: Yes     Difficult airway:    No       If yes, was red tape placed around ETT:   No    Was this a Code Situation:    No             BP: (!) 77/53            Kushal Rodney RCP  12:35 AM
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
## 2022-09-30 NOTE — PROGRESS NOTES
INTENSIVE CARE UNIT  Resident Physician Progress Note    Patient - Rodo Hope  Date of Admission -  9/29/2022  7:15 PM  Date of Evaluation -  9/30/2022  Room and Bed Number -  2295/7917-10   Hospital Day - 1    SUBJECTIVE:     HISTORY OF PRESENT ILLNESS:    75 yo female who presents as a transfer from [facility]. She was brought there after being found down after an unknown amount of time. Per EMS the patients significant other is in a care facility and she lives independently. Was found to be altered with multiple pressure sores, covered in feces and maggots. She has a large and deep pressure sore on the right shoulder and a few others also on the right side. Was unable to provide much history. Septic work up was initiated at [facility] AT [place]. Ct Head was done and unremarkable. She was started on zosyn and fluids. Required levophed and nasal cannula oxygen. She was also found to be hypothermic and her temperature was increased to 98. Neuro/stroke was consulted in our emergency department for AMS. NIH of 20 on presentation. Was found to be hypothermic started on bearhugger. Was found to be severely dehydrated with Na of 158, BUN/Cr 93/1. 41. Lactic acid was 3.1,  and CK was 643. R IJ was placed in the emergency department due to levophed requirements. Patient was increasingly altered and then had a large volume episode of coffee ground emesis. Decision was made to intubate for airway protection. Patient received 3L fluid and continued to be aneuric. General surgery was consulted due to wounds. OVERNIGHT EVENTS:      Patient had increasing pressure requirements and was found to be acidotic. She was maxed out on levophed, jose synephrine and vasopressin. Sedation taken off.     TODAY:    MAP in 50s, 1L bolus given. Epi started. 1 amp of bicarb. Unable to contact family at this time will discuss with .      PRVC: 30/390/12/100%  pH 7.04/65.7/143.7/14    OBJECTIVE:     VITAL SIGNS:  Patient Vitals for the past 8 hrs:   BP Temp Temp src Pulse Resp SpO2   22 1200 -- (!) 96.3 °F (35.7 °C) -- (!) 117 30 --   22 1145 -- -- -- (!) 121 30 --   22 1130 -- -- -- (!) 123 30 --   22 1115 -- -- -- (!) 124 30 --   22 1102 -- -- -- (!) 123 30 95 %   22 1100 -- 97.3 °F (36.3 °C) Axillary (!) 123 27 --   22 1045 -- -- -- (!) 122 30 --   22 1030 -- -- -- (!) 121 30 --   22 1015 -- -- -- (!) 120 30 --   22 1000 -- (!) 95.9 °F (35.5 °C) -- (!) 118 30 --   22 0952 -- -- -- (!) 117 30 --   22 0945 -- -- -- (!) 118 30 --   22 0930 -- -- -- (!) 117 30 --   22 0915 -- -- -- (!) 118 30 --   22 0900 -- -- -- (!) 118 30 --   22 0845 -- -- -- (!) 117 30 (!) 65 %   22 0830 -- -- -- (!) 115 30 --   22 0815 -- -- -- (!) 116 30 --   22 0807 -- -- -- (!) 118 30 --   22 0800 -- (!) 95.4 °F (35.2 °C) -- (!) 119 30 --   22 0745 -- -- -- (!) 123 30 --   22 0730 -- -- -- (!) 119 30 --   22 0715 -- -- -- (!) 131 30 --   22 0700 (!) 80/13 -- -- (!) 129 30 --   22 0615 -- -- -- (!) 129 30 --   22 0600 -- (!) 95.9 °F (35.5 °C) Esophageal (!) 131 30 --   22 0545 -- -- -- (!) 129 30 --   22 0530 -- -- -- (!) 128 30 --   22 0515 -- -- -- (!) 127 30 --       Last Body weight:   Wt Readings from Last 3 Encounters:   22 172 lb 6.4 oz (78.2 kg)   22 195 lb 1.6 oz (88.5 kg)   22 195 lb (88.5 kg)       Body Mass Index : Body mass index is 24.74 kg/m². Tmax over 24 hours: Temp (24hrs), Av.2 °F (34.6 °C), Min:89.2 °F (31.8 °C), Max:97.3 °F (36.3 °C)      Ins/Outs:    In: 4305.8 [I.V.:3398.1; NG/GT:300]  Out: 145 [Urine:25]    PHYSICAL EXAM:  Constitutional: Unresponsive and pale  EENT: Pupils sluggishly reactive, does not track  Neck: Supple, symmetrical, trachea midline, no adenopathy, thyroid symmetric, no jvd skin normal  Respiratory: diminished mechanical breath sounds  Cardiovascular: tachycardic, diminished pulses  Abdomen: soft, nontender, nondistended, no masses or organomegaly  Extremities:  Large pressure sores over right side of body. Pictures in chart    MEDICATIONS:  Scheduled Meds:   sodium chloride flush  5-40 mL IntraVENous 2 times per day    sennosides-docusate sodium  1 tablet Oral BID    vancomycin (VANCOCIN) intermittent dosing (placeholder)   Other RX Placeholder    piperacillin-tazobactam  3,375 mg IntraVENous q8h    heparin (porcine)  5,000 Units SubCUTAneous 3 times per day    dextrose           Continuous Infusions:   sodium chloride Stopped (09/30/22 1010)    norepinephrine 100 mcg/min (09/30/22 1302)    phenylephrine (JASON-SYNEPHRINE) 50mg/250mL infusion 300 mcg/min (09/30/22 1302)    propofol Stopped (09/30/22 0355)    pantoprozole (PROTONIX) infusion 8 mg/hr (09/30/22 1302)    vasopressin (Septic Shock) infusion 0.04 Units/min (09/30/22 1302)    dextrose      EPINEPHrine 20 mcg/min (09/30/22 1302)    IV infusion builder 100 mL/hr at 09/30/22 1053       PRN Meds:   sodium chloride flush, 5-40 mL, PRN  sodium chloride, , PRN  ondansetron, 4 mg, Q8H PRN   Or  ondansetron, 4 mg, Q6H PRN  polyethylene glycol, 17 g, Daily PRN  acetaminophen, 650 mg, Q6H PRN   Or  acetaminophen, 650 mg, Q6H PRN  potassium chloride, 20 mEq, PRN   Or  potassium chloride, 10 mEq, PRN  glucose, 4 tablet, PRN  dextrose bolus, 125 mL, PRN   Or  dextrose bolus, 250 mL, PRN  glucagon (rDNA), 1 mg, PRN  dextrose, , Continuous PRN      SUPPORT DEVICES: [x] Ventilator [] BIPAP  [] Nasal Cannula [] Room Air    VENT SETTINGS (Comprehensive) (if applicable):   PRVC mode, FiO2 100%, PEEP 12, Respiratory Rate 30, Tidal Volume 390  Vent Information  Ventilator ID: [email or web address]  Equipment Changed: [facility]  Additional Respiratory Assessments  Heart Rate: (!) 117  Resp: 30  SpO2: 95 %  End Tidal CO2: 10 (%)  Position: Semi-Sims's  Humidification Source: [facility]  Lab Results   Component Value Date/Time    MODE [facility] 09/30/2022 05:02 AM       ABGs:   Arterial Blood Gas result:  pH 7.04; pCO2 65.7; pO2 143.7; HCO3 17.8; BE14; %O2 Sat 62. No results found for: PH, PCO2, PO2, HCO3, O2SAT    DATA:  Complete Blood Count:   Recent Labs     09/29/22 2053 09/30/22 0439   WBC 12.6* 13.7*   RBC 5.27* 5.77*   HGB 17.2* 18.2*   HCT 49.2* 57.0*   MCV 93.4 98.8   MCH 32.6 31.5   MCHC 35.0* 31.9   RDW 13.8 14.3    Platelet clumps present, count appears adequate.    MPV 11.0  --         Last 3 Blood Glucose:   Recent Labs     09/29/22 2053 09/30/22  0041 09/30/22 0439 09/30/22  0537 09/30/22  0922   GLUCOSE 128* 87 114* 96 103*        PT/INR:  No results found for: PROTIME, INR  PTT:    Lab Results   Component Value Date/Time    APTT 25.7 07/08/2022 05:43 PM       Basic Metabolic Profile:   Recent Labs     09/30/22 0439 09/30/22 0537 09/30/22 0922 09/30/22  1101   * 154* 160*  --    K 6.1* 6.2* 5.7*  --    * 122* 124*  --    CO2 18* 16* 11*  --    BUN 85* 85* 83*  --    CREATININE 1.83* 1.91* 2.15*  --    GLUCOSE 114* 96 103*  --    PHOS  --   --   --  11.2*       Liver Function:  Recent Labs     09/29/22 2053 09/30/22  1100   PROT 4.9* 3.1*   LABALBU 1.9*  --    ALT 69*  --    AST 48*  --    ALKPHOS 97  --    BILITOT 1.2  --        Magnesium:   Lab Results   Component Value Date/Time    MG 2.7 09/30/2022 09:22 AM    MG 2.4 09/30/2022 04:39 AM     Phosphorus:   Lab Results   Component Value Date/Time    PHOS 11.2 09/30/2022 11:01 AM     Ionized Calcium:   Lab Results   Component Value Date/Time    CAION 1.48 09/30/2022 04:39 AM        Urinalysis:   Lab Results   Component Value Date/Time    NITRU NEGATIVE 09/29/2022 09:09 PM    COLORU Dark Yellow 09/29/2022 09:09 PM    PHUR 5.0 09/29/2022 09:09 PM    WBCUA 10 TO 20 09/29/2022 09:09 PM    RBCUA 2 TO 5 09/29/2022 09:09 PM    BACTERIA FEW 09/29/2022 09:09 PM    SPECGRAV 1.022 09/29/2022 09:09 PM    LEUKOCYTESUR SMALL 09/29/2022 09:09 PM UROBILINOGEN Normal 09/29/2022 09:09 PM    BILIRUBINUR NEGATIVE  Verified by ictotest. 09/29/2022 09:09 PM    GLUCOSEU NEGATIVE 09/29/2022 09:09 PM    KETUA NEGATIVE 09/29/2022 09:09 PM       HgBA1c:    Lab Results   Component Value Date/Time    LABA1C 6.1 07/08/2022 03:43 PM     TSH:    Lab Results   Component Value Date/Time    TSH 1.82 07/08/2022 03:43 PM     Lactic Acid: No results found for: LACTA   Troponin: No results for input(s): TROPONINI in the last 72 hours. Microbiology:  Urine Culture:  No components found for: CURINE  Blood Culture:  No components found for: CBLOOD, CFUNGUSBL  Sputum Culture:  No components found for: CSPUTUM    Other Labs:  Results for orders placed or performed during the hospital encounter of 09/29/22   Culture, Blood 1    Specimen: Blood   Result Value Ref Range    Specimen Description . BLOOD     Special Requests LEFT AC 20ML     Culture NO GROWTH 12 HOURS    CBC with Auto Differential   Result Value Ref Range    WBC 12.6 (H) 3.5 - 11.3 k/uL    RBC 5.27 (H) 3.95 - 5.11 m/uL    Hemoglobin 17.2 (H) 11.9 - 15.1 g/dL    Hematocrit 49.2 (H) 36.3 - 47.1 %    MCV 93.4 82.6 - 102.9 fL    MCH 32.6 25.2 - 33.5 pg    MCHC 35.0 (H) 28.4 - 34.8 g/dL    RDW 13.8 11.8 - 14.4 %    Platelets 224 837 - 604 k/uL    MPV 11.0 8.1 - 13.5 fL    NRBC Automated 0.0 0.0 per 100 WBC    Immature Granulocytes 1 (H) 0 %    Seg Neutrophils 87 (H) 36 - 66 %    Lymphocytes 8 (L) 24 - 44 %    Monocytes 4 1 - 7 %    Eosinophils % 0 (L) 1 - 4 %    Basophils 0 0 - 2 %    Absolute Immature Granulocyte 0.13 0.00 - 0.30 k/uL    Segs Absolute 10.96 (H) 1.8 - 7.7 k/uL    Absolute Lymph # 1.01 1.0 - 4.8 k/uL    Absolute Mono # 0.50 0.1 - 0.8 k/uL    Absolute Eos # 0.00 0.0 - 0.4 k/uL    Basophils Absolute 0.00 0.0 - 0.2 k/uL    Morphology Normal    Comprehensive Metabolic Panel   Result Value Ref Range    Glucose 128 (H) 70 - 99 mg/dL    BUN 93 (HH) 8 - 23 mg/dL    Creatinine 1.41 (H) 0.50 - 0.90 mg/dL    Calcium 10.4 8.6 - 10.4 mg/dL    Sodium 158 (H) 135 - 144 mmol/L    Potassium 3.0 (L) 3.7 - 5.3 mmol/L    Chloride 123 (H) 98 - 107 mmol/L    CO2 21 20 - 31 mmol/L    Anion Gap 14 9 - 17 mmol/L    Alkaline Phosphatase 97 35 - 104 U/L    ALT 69 (H) 5 - 33 U/L    AST 48 (H) <32 U/L    Total Bilirubin 1.2 0.3 - 1.2 mg/dL    Total Protein 4.9 (L) 6.4 - 8.3 g/dL    Albumin 1.9 (L) 3.5 - 5.2 g/dL    Albumin/Globulin Ratio 0.6 (L) 1.0 - 2.5    GFR Non- 36 (L) >60 mL/min    GFR  44 (L) >60 mL/min    GFR Comment         C-Reactive Protein   Result Value Ref Range    .2 (H) 0.0 - 5.0 mg/L   Troponin   Result Value Ref Range    Troponin, High Sensitivity 23 (H) 0 - 14 ng/L   Urinalysis with Microscopic   Result Value Ref Range    Color, UA Dark Yellow (A) Yellow    Turbidity UA Cloudy (A) Clear    Glucose, Ur NEGATIVE NEGATIVE    Bilirubin Urine NEGATIVE  Verified by ictotest. (A) NEGATIVE    Ketones, Urine NEGATIVE NEGATIVE    Specific Gravity, UA 1.022 1.005 - 1.030    Urine Hgb SMALL (A) NEGATIVE    pH, UA 5.0 5.0 - 8.0    Protein, UA 1+ (A) NEGATIVE    Urobilinogen, Urine Normal Normal    Nitrite, Urine NEGATIVE NEGATIVE    Leukocyte Esterase, Urine SMALL (A) NEGATIVE    WBC, UA 10 TO 20 0 - 5 /HPF    RBC, UA 2 TO 5 0 - 2 /HPF    Casts UA 50  0 - 2 /LPF    Casts UA HYALINE 0 - 2 /LPF    Crystals, UA CALCIUM OXALATE (A) None /HPF    Crystals, UA FEW (A) None /HPF    Epithelial Cells UA 10 TO 20 0 - 5 /HPF    Bacteria, UA FEW (A) None   Lactate, Sepsis   Result Value Ref Range    Lactic Acid, Sepsis, Whole Blood 3.1 (H) 0.5 - 1.9 mmol/L   Lactate, Sepsis   Result Value Ref Range    Lactic Acid, Sepsis, Whole Blood 2.6 (H) 0.5 - 1.9 mmol/L   BLOOD GAS, VENOUS   Result Value Ref Range    pH, Jas 7.153 (LL) 7.320 - 7.420    pCO2, Jas 71.5 (H) 39 - 55 mm Hg    pO2, Jas 58.1 (H) 30 - 50 mm Hg    HCO3, Venous 24.0 24 - 30 mmol/L    Negative Base Excess, Jas 7.2 (H) 0.0 - 2.0 mmol/L    O2 Sat, Jas 76.9 60.0 - 85.0 %    Carboxyhemoglobin 1.4 0 - 5 %    Pt Temp 37.0     FIO2 INFORMATION NOT PROVIDED    CK   Result Value Ref Range    Total  (H) 26 - 192 U/L   Basic Metabolic Panel   Result Value Ref Range    Glucose 87 70 - 99 mg/dL    BUN 61 (H) 8 - 23 mg/dL    Creatinine 0.95 (H) 0.50 - 0.90 mg/dL    Calcium 5.9 (LL) 8.6 - 10.4 mg/dL    Sodium 155 (H) 135 - 144 mmol/L    Potassium 1.9 (LL) 3.7 - 5.3 mmol/L    Chloride 131 (HH) 98 - 107 mmol/L    CO2 12 (L) 20 - 31 mmol/L    Anion Gap 12 9 - 17 mmol/L    GFR Non-African American 57 (L) >60 mL/min    GFR African American >60 >60 mL/min    GFR Comment         Troponin   Result Value Ref Range    Troponin, High Sensitivity 21 (H) 0 - 14 ng/L   Basic Metabolic Panel w/ Reflex to MG   Result Value Ref Range    Glucose 114 (H) 70 - 99 mg/dL    BUN 85 (H) 8 - 23 mg/dL    Creatinine 1.83 (H) 0.50 - 0.90 mg/dL    Calcium 10.9 (H) 8.6 - 10.4 mg/dL    Sodium 157 (H) 135 - 144 mmol/L    Potassium 6.1 (HH) 3.7 - 5.3 mmol/L    Chloride 124 (H) 98 - 107 mmol/L    CO2 18 (L) 20 - 31 mmol/L    Anion Gap 15 9 - 17 mmol/L    GFR Non-African American 27 (L) >60 mL/min    GFR  32 (L) >60 mL/min    GFR Comment         Basic Metabolic Panel w/ Reflex to MG   Result Value Ref Range    Glucose 103 (H) 70 - 99 mg/dL    BUN 83 (H) 8 - 23 mg/dL    Creatinine 2.15 (H) 0.50 - 0.90 mg/dL    Calcium 9.1 8.6 - 10.4 mg/dL    Sodium 160 (H) 135 - 144 mmol/L    Potassium 5.7 (H) 3.7 - 5.3 mmol/L    Chloride 124 (H) 98 - 107 mmol/L    CO2 11 (L) 20 - 31 mmol/L    Anion Gap 25 (H) 9 - 17 mmol/L    GFR Non-African American 22 (L) >60 mL/min    GFR  27 (L) >60 mL/min    GFR Comment         Calcium, Ionized   Result Value Ref Range    Calcium, Ionized 1.48 (H) 1.13 - 1.33 mmol/L   CBC with Auto Differential   Result Value Ref Range    WBC 13.7 (H) 3.5 - 11.3 k/uL    RBC 5.77 (H) 3.95 - 5.11 m/uL    Hemoglobin 18.2 (H) 11.9 - 15.1 g/dL    Hematocrit 57.0 (H) 36.3 - 47.1 %    MCV 98.8 82.6 - 102.9 fL    MCH 31.5 25.2 - 33.5 pg    MCHC 31.9 28.4 - 34.8 g/dL    RDW 14.3 11.8 - 14.4 %    Platelets Platelet clumps present, count appears adequate.  138 - 453 k/uL    NRBC Automated 0.6 (H) 0.0 per 100 WBC    Immature Granulocytes 4 (H) 0 %    Seg Neutrophils 80 (H) 36 - 66 %    Lymphocytes 14 (L) 24 - 44 %    Monocytes 2 1 - 7 %    Eosinophils % 0 (L) 1 - 4 %    Basophils 0 0 - 2 %    Absolute Immature Granulocyte 0.55 (H) 0.00 - 0.30 k/uL    Segs Absolute 10.96 (H) 1.8 - 7.7 k/uL    Absolute Lymph # 1.92 1.0 - 4.8 k/uL    Absolute Mono # 0.27 0.1 - 0.8 k/uL    Absolute Eos # 0.00 0.0 - 0.4 k/uL    Basophils Absolute 0.00 0.0 - 0.2 k/uL    Morphology INCREASED BANDS PRESENT    Magnesium   Result Value Ref Range    Magnesium 2.4 1.6 - 2.6 mg/dL   Magnesium   Result Value Ref Range    Magnesium 2.7 (H) 1.6 - 2.6 mg/dL   Troponin   Result Value Ref Range    Troponin, High Sensitivity 44 (H) 0 - 14 ng/L   Lactic Acid   Result Value Ref Range    Lactic Acid, Whole Blood 16.0 (H) 0.7 - 2.1 mmol/L   Troponin   Result Value Ref Range    Troponin, High Sensitivity 70 (HH) 0 - 14 ng/L   Basic Metabolic Panel   Result Value Ref Range    Glucose 96 70 - 99 mg/dL    BUN 85 (H) 8 - 23 mg/dL    Creatinine 1.91 (H) 0.50 - 0.90 mg/dL    Calcium 10.1 8.6 - 10.4 mg/dL    Sodium 154 (H) 135 - 144 mmol/L    Potassium 6.2 (HH) 3.7 - 5.3 mmol/L    Chloride 122 (H) 98 - 107 mmol/L    CO2 16 (L) 20 - 31 mmol/L    Anion Gap 16 9 - 17 mmol/L    GFR Non-African American 25 (L) >60 mL/min    GFR  31 (L) >60 mL/min    GFR Comment         Electrophoresis Protein, Serum   Result Value Ref Range    Total Protein 3.1 (L) 6.4 - 8.3 g/dL    Albumin (calculated) PENDING g/dL    Albumin % PENDING %    Alpha-1-Globulin PENDING g/dL    Alpha 1 % PENDING %    Alpha-2-Globulin PENDING g/dL    Alpha 2 % PENDING %    Beta Globulin PENDING g/dL    Beta Percent PENDING %    Gamma Globulin PENDING g/dL    Gamma Globulin % PENDING %    Total Prot. Sum PENDING g/dL    Total Prot.  Sum,% PENDING %    Protein Electrophoresis, Serum PENDING     Pathologist PENDING    Kappa/Lambda Quantitative Free Light Chains, Serum   Result Value Ref Range    Kappa Free Light Chains QNT 4.58 (H) 0.37 - 1.94 mg/dL    Lambda Free Light Chains QNT 1.52 0.57 - 2.63 mg/dL    Free Kappa/Lambda Ratio 3.01 (H) 0.26 - 1.65   Protein, urine, random   Result Value Ref Range    Total Protein, Urine 83 mg/dL   Creatinine, Random Urine   Result Value Ref Range    Creatinine, Ur 74.4 28.0 - 217.0 mg/dL   Eosinophils, Urine   Result Value Ref Range    Eosinophil, Ur NONE SEEN NONE SEEN   Phosphorus   Result Value Ref Range    Phosphorus 11.2 (HH) 2.6 - 4.5 mg/dL   Vancomycin Level, Random   Result Value Ref Range    Vancomycin Rm 28.8 ug/mL   Arterial Blood Gas, POC   Result Value Ref Range    POC pH 7.169 (LL) 7.350 - 7.450    POC pCO2 36.2 35.0 - 48.0 mm Hg    POC PO2 103.2 83.0 - 108.0 mm Hg    POC HCO3 13.2 (L) 21.0 - 28.0 mmol/L    Negative Base Excess, Art 14 (H) 0.0 - 2.0    POC O2 SAT 96 94.0 - 98.0 %    FIO2 100.0    Arterial Blood Gas, POC   Result Value Ref Range    POC pH 7.074 (LL) 7.350 - 7.450    POC pCO2 76.1 (HH) 35.0 - 48.0 mm Hg    POC PO2 114.3 (H) 83.0 - 108.0 mm Hg    POC HCO3 22.2 21.0 - 28.0 mmol/L    Negative Base Excess, Art 10 (H) 0.0 - 2.0    POC O2 SAT 96 94.0 - 98.0 %    O2 Device/Flow/% Adult Ventilator     Griffin Test NOT APPLICABLE     Sample Site Arterial Line     Mode PRVC     FIO2 100.0     Pt Temp 34.2     POC pH Temp 7.11     POC pCO2 Temp 67 mm Hg    POC pO2 Temp 98 mm Hg   POCT Glucose   Result Value Ref Range    POC Glucose 98 74 - 100 mg/dL   Notification Panel, POC   Result Value Ref Range    Action NotifyRN     NOTIFY onesimo     READ BACK Yes     Date/Time 09/30/202203:10:00    Arterial Blood Gas, POC   Result Value Ref Range    POC pH 7.041 (LL) 7.350 - 7.450    POC pCO2 65.7 (H) 35.0 - 48.0 mm Hg    POC PO2 143.7 (H) 83.0 - 108.0 mm Hg    POC HCO3 17.8 (L) 21.0 - 28.0 mmol/L    Negative Base Excess, Art 14 (H) 0.0 - 2.0    POC O2 SAT 98 94.0 - 98.0 %    O2 Device/Flow/% Adult Ventilator     Griffin Test NOT APPLICABLE     Sample Site Arterial Line     Mode PRVC     FIO2 100.0     Pt Temp 35.1     POC pH Temp 7.07     POC pCO2 Temp 61 mm Hg    POC pO2 Temp 133 mm Hg   Lactic Acid, POC   Result Value Ref Range    POC Lactic Acid 5.33 (H) 0.56 - 1.39 mmol/L   POCT Glucose   Result Value Ref Range    POC Glucose 100 74 - 100 mg/dL   Notification Panel, POC   Result Value Ref Range    Action NotifyRN     NOTIFY onesimo     READ BACK Yes     Date/Time 09/30/202205:04:00    POC Glucose Fingerstick   Result Value Ref Range    POC Glucose 189 (H) 65 - 105 mg/dL   POC Glucose Fingerstick   Result Value Ref Range    POC Glucose 64 (L) 65 - 105 mg/dL   Arterial Blood Gas, POC   Result Value Ref Range    POC pH 6.904 (LL) 7.350 - 7.450    POC pCO2 56.4 (H) 35.0 - 48.0 mm Hg    POC PO2 165.2 (H) 83.0 - 108.0 mm Hg    POC HCO3 11.1 (L) 21.0 - 28.0 mmol/L    Negative Base Excess, Art 22 (H) 0.0 - 2.0    POC O2 SAT 98 94.0 - 98.0 %    O2 Device/Flow/% Adult Ventilator     Griffin Test NOT APPLICABLE     Sample Site Arterial Line     FIO2 100.0    POCT Glucose   Result Value Ref Range    POC Glucose 62 (L) 74 - 100 mg/dL   POC Glucose Fingerstick   Result Value Ref Range    POC Glucose 100 65 - 105 mg/dL   POC Glucose Fingerstick   Result Value Ref Range    POC Glucose 85 65 - 105 mg/dL   POC Glucose Fingerstick   Result Value Ref Range    POC Glucose 74 65 - 105 mg/dL       Radiology/Imaging:  US RENAL COMPLETE   Final Result   1. Cardenas catheter within a collapsed urinary bladder. 2. Bilateral renal atrophy. No hydronephrosis. XR ABDOMEN FOR NG/OG/NE TUBE PLACEMENT   Final Result   1. ET tube is 3.9 cm above the lois. 2. Enteric catheter tip terminates in the proximal gastric body.    3. Bilateral perihilar and right basilar infiltrates with a small to moderate   right effusion, suspicious for multilobar pneumonia. 4. Gaseous distention of the stomach, as well as loops of small bowel which   appear mildly dilated. Findings felt to represent a partial small bowel   obstruction. XR CHEST PORTABLE   Final Result   1. ET tube is 3.9 cm above the lois. 2. Enteric catheter tip terminates in the proximal gastric body. 3. Bilateral perihilar and right basilar infiltrates with a small to moderate   right effusion, suspicious for multilobar pneumonia. 4. Gaseous distention of the stomach, as well as loops of small bowel which   appear mildly dilated. Findings felt to represent a partial small bowel   obstruction. XR CHEST PORTABLE   Final Result   Multifocal infiltrates seen throughout the lungs bilaterally suspicious for   pneumonia, with a small right-sided pleural effusion. Right IJ central venous catheter terminates low within the right atrium. The   cavoatrial junction is approximately 5 cm proximal from the catheters current   position. No pneumothorax. XR SHOULDER RIGHT (MIN 2 VIEWS)   Final Result   1. No acute osseous fracture or dislocation. 2. Large soft tissue lucency related to the given history of wound overlying   the right proximal shoulder. 3. No osteomyelitis. 4. Patchy infiltrates seen throughout the lungs.          MRI BRAIN WO CONTRAST    (Results Pending)   MRA HEAD WO CONTRAST    (Results Pending)   MRA NECK WO CONTRAST    (Results Pending)   CT HEAD WO CONTRAST    (Results Pending)       ASSESSMENT:     Patient Active Problem List    Diagnosis Date Noted    Hypernatremia 09/29/2022    Class 1 obesity due to excess calories with serious comorbidity and body mass index (BMI) of 34.0 to 34.9 in adult 07/08/2022    Insomnia 07/08/2022    Frequent headaches 07/08/2022    Impaired gait 07/08/2022    Left breast mass 07/08/2022    Right upper lobe pulmonary nodule 07/08/2022 Cellulitis of lower extremity 2022    Stroke-like symptoms     Expressive aphasia     AMMON (obstructive sleep apnea) 2021    Hypertension 2021    Type 2 diabetes mellitus without complication ([facility] [address]     Leg swelling         PLAN:       PLAN/MEDICAL DECISION MAKING:  Neurologic:   Neuro checks per protocol  no sedation  Not responsive  Neurology consulted  Cardiovascular:  Hemodynamically stable  MAP goal 65  Maxed on  Levophed, vasopressin and neosynephrine  Will start epinephrine gtt  Pulmonary:  Maintain oxygen sats >92%  Pulmonary toilet  Vent Information  Ventilator ID: cintron  Equipment Changed: HME  GI/Nutrition  sennakot 2 tabs in AM  Ulcer Prophylaxis: PPI (protonix gtt)  Diet:Diet NPO  Renal/Fluid/Electrolyte  IV Fluids: 0.9NS @ 125 mL/Hr   I/O: In: 4305.8 [I.V.:3398.1; NG/GT:300]  Out: 145 [Urine:25]  Monitor electrolytes, replace PRN   Nephrology consulted  3 amps bicarb given total today  Will start bicarb gtt  1L bolus given   ID  WBC:   Lab Results   Component Value Date    WBC 13.7 (H) 2022     Tmax: Temp (24hrs), Av.2 °F (34.6 °C), Min:89.2 °F (31.8 °C), Max:97.3 °F (36.3 °C)    Antimicrobials: vancomycin and Zosyn  Hematology:  Recent Labs     22  0439   HGB 17.2* 18.2*    stable  Endocrine:   glucose controlled - most recent BGL is   Recent Labs     22  0439 22  0537 22  0922   GLUCOSE 114* 96 103*     DVT Prophylaxis  Heparin  Discharge Needs:  PT, OT, ST, SW, and Case Management      CODE STATUS: DNR-CCA    Grandson is now decision maker. DISPOSITION:  [x] To remain ICU: Intubated, pressure support secondary to septic shock and possible GI bleed.    [] OK for out of ICU from [facility], [place]  Emergency Medicine [address]  2022, 8:14 AM EDT

## 2022-09-30 NOTE — CONSULTS
[id number] [facility] Neurology   IN-PATIENT SERVICE    CONSULT NOTE            Date:   9/29/2022  Patient name:  Boo Regalado  Date of admission:  9/29/2022  YOB: 1945    Chief Complaint:     Chief Complaint   Patient presents with    Wound Check    Blood Infection       Reason for Consult:      AMS, right-sided weakness, concern for stroke    History of Present Illness: The patient is a 76 y.o. female with PMH of colon cancer, HTN, HLD, T2DM, OHS, GERD, who presents as a transfer from [facility] after being found down for unknown amount of time. It is unclear who called EMS but they apparently had to knock the patient's door down and found her down on the floor. Patient was altered and covered in feces and maggots. She was taken to [facility] where she underwent evaluation with CT head which was unremarkable. She was found to have multiple deep pressure ulcers on the right side with multiple lab abnormalities. She was subsequently transferred to [facility] for further work-up and treatment. Stroke team was consulted upon arrival due to concern for stroke with patient's right-sided weakness worse than the left side. Patient is severely altered and unable to provide any significant history, NIH 20. Last known well appears to be at least 1 week ago. Patient apparently lives at home by herself. Her baseline is unknown.     Prior to arrival patient was on  Antiplatelets/anticoagulants: Aspirin 81 mg daily  Statins: None    Smoking history: Unknown      Past Medical History:     Past Medical History:   Diagnosis Date    Acute cystitis 12/2021    Cancer [facility])     colon    Chronic respiratory acidosis 2018    Diabetes mellitus ([facility] [address] [address]     GERD (gastroesophageal reflux disease)     Hypercapnia 2018    Hyperlipidemia     Hypertension     Left leg cellulitis 12/2021    Obesity hypoventilation syndrome ([facility] [address] 2018        Past Surgical History:     Past Surgical History:   Procedure Laterality Date    APPENDECTOMY      CHOLECYSTECTOMY      COLON SURGERY      for colon cancer        Medications Prior to Admission:     Prior to Admission medications    Medication Sig Start Date End Date Taking? Authorizing Provider   aspirin 81 MG EC tablet Take 1 tablet by mouth daily 7/15/22   Peter Champagne MD   Multiple Vitamins-Minerals (THERAPEUTIC MULTIVITAMIN-MINERALS) tablet Take 1 tablet by mouth daily    Historical Provider, MD   melatonin 3 MG TABS tablet Take 3 mg by mouth nightly as needed    Historical Provider, MD        Allergies:     Patient has no known allergies. Social History:     Tobacco:    reports that she has never smoked. She has never used smokeless tobacco.  Alcohol:      reports no history of alcohol use. Drug Use:  reports no history of drug use. Family History:     Family History   Problem Relation Age of Onset    Alzheimer's Disease Mother     Heart Failure Father     Lung Cancer Sister     Brain Cancer Brother        Review of Systems:     Unable to perform    Physical Exam:   /87   Pulse 72   Resp 14   Wt 195 lb (88.5 kg)   SpO2 95%   BMI 27.98 kg/m²   No data recorded. Limited NEUROLOGIC EXAMINATION    Patient altered and not following any significant commands  Only able to say yes to her name  Spontaneously moving and withdrawing all extremities, L>R  Sensation appears grossly intact  Reflexes symmetric    NIH STROKE SCALE:    Time Performed: 7:40 PM      1a. Level of consciousness:  2 - not alert, requires repeated stimulation to attend, or is obtunded and requires strong or painful stimulation to make movements (not stereotyped)   1b. Level of consciousness questions:  2 - answers neither question correctly  1c. Level of consciousness questions:  2 - performs neither task correctly  2. Best Gaze:  0 - normal  3. Visual:  0 - no visual loss  4.     Facial Palsy:  1 - minor paralysis (flattened nasolabial fold, asymmetric on smiling)  5a. Motor left arm:  2 - some effort against gravity, limb cannot get to or maintain (if cued) 90 (or 45) degrees, drifts down to bed, but has some effort against gravity   5b. Motor right arm:  2 - some effort against gravity, limb cannot get to or maintain (if cued) 90 (or 45) degrees, drifts down to bed, but has some effort against gravity   6a. Motor left le - some effort against gravity; leg falls to bed by 5 seconds but has some effort against gravity  6b. Motor right le - some effort against gravity; leg falls to bed by 5 seconds but has some effort against gravity  7. Limb Ataxia:  0 - absent  8. Sensory:  0 - normal; no sensory loss  9. Best Language:  3 - mute, global aphasia; no usable speech or auditory comprehension  10. Dysarthria:  2 - severe; patient speech is so slurred as to be unintelligible in the absence of or our of proportion to any dysphagia, or is mute/anarthric   11. Extinction and Inattention:  0 - no abnormality    TOTAL:  20      Diagnostics:      Laboratory Testing:    CBC:   Recent Labs     22   WBC PENDING   HGB 17.2*   PLT PENDING       BMP:  No results for input(s): NA, K, CL, CO2, BUN, CREATININE, GLUCOSE in the last 72 hours.       Lab Results   Component Value Date    CHOL 187 2022    LDLCHOLESTEROL 114 2022    HDL 41 2022    TRIG 161 (H) 2022    ALT 15 2022    AST 16 2022    TSH 1.82 2022    LABA1C 6.1 (H) 2022           Imaging/Diagnostics:      CT head       Assessment and plan:       Patient Active Problem List   Diagnosis    AMMON (obstructive sleep apnea)    Hypertension    Type 2 diabetes mellitus without complication (HCC)    Class 1 obesity due to excess calories with serious comorbidity and body mass index (BMI) of 34.0 to 34.9 in adult    Insomnia    Frequent headaches    Leg swelling    Stroke-like symptoms    Impaired gait    Left breast mass    Right upper lobe pulmonary nodule    Expressive aphasia    Cellulitis of lower extremity                      76 y.o. female who presents as a transfer from [facility], was found down at home for unknown amount of time. Has severe ulcers on the right side of her body and appears altered. No usable speech and not following any significant commands. Does appear to have right-sided weakness more than the left. CT head unremarkable at outlying facility. Will obtain MRI/MRA brain to rule out stroke. Further metabolic work-up and treatment per primary team.    Last Known Well (date and time): 1 week ago    2. Candidate for IV tPA therapy     Yes []     No  [x] due to the following exclusion criteria: Out of window    3. Candidate for Thrombectomy    Yes []      No [x] due to the following exclusion criteria: No concern for LVO      Imaging   - CT Head  WO : Unremarkable  - MRI Brain WO pending  -MRA head and neck without contrast pending    Medications   - Aspirin 81  mg daily    - Folic acid 1mg BID  - Atorvastatin 80 mg nightly     Labs  - Fasting Lipid panel  - HbA1c      - PT, OT, Speech eval   - Hydration per primary team  - Telemetry   - Neuro checks per protocol  - We recommend SBP normotensive  - Blood glucose goal less than 180  - Rest of medical management per primary team      Thank you for your consult.       Felix Cali MD   PGY-3 Neurology Resident  9/29/2022 at 9:34 PM

## 2022-09-30 NOTE — ED NOTES
Restraint order placed by Dr Sherree Lanes. Writer began restraint documentation but Dr Sherree Lanes determined restraints no longer needed prior to application of restraints.       Miguelito Minor RN  09/30/22 9844

## 2022-09-30 NOTE — ED NOTES
The following labs were labeled with appropriate pt sticker and tubed to lab:     [] Blue     [x] Lavender   [] on ice  [x] Green/yellow  [x] Green/black [x] on ice  [] Dionna Moya  [] on ice  [] Yellow  [] Red  [] Pink  [] ABG  [] VBG    [] COVID-19 swab    [] Rapid  [] PCR  [] Flu swab  [] Peds Viral Panel     [] Urine Sample  [] Pelvic Cultures  [x] Blood Cultures  [] X 2  [] STREP Cultures         Imelda Blackburn RN  09/29/22 4998

## 2022-09-30 NOTE — ED NOTES
80 of rocuronium given IVP by Dr. Mortimer Musca at left arm        [facility], [address] [place] [address]  09/29/22 3629

## 2022-09-30 NOTE — CONSULTS
Renal Consult Note    Patient :  Marlin White; 76 y.o. MRN# [id number]  Location:  [address] [place], [place] [address]  Attending:  Virgilio Lind MD  Admit Date:  9/29/2022   Hospital Day: 1    Reason for Consult:     Asked by Dr Virgilio Lind MD to see for YOBANI/Elevated Creatinine. History Obtained From:     non-family caregiver - staff, electronic medical record    History of Present Illness:     Marlin White; 76 y.o. female with past medical history as mentioned below. No significant past medical history according to the records. Does not take any medications other than multivitamins and aspirin. It seems she is living independently at present as her significant other is now in an ECF facility. According to EMS the patient was found down with altered mental status at home. She was covered in feces and also maggots. It seems she was lying in that position for some time probably days. She had a large deep pressure sore on the right shoulder and other pressure ulcers in the right side of her body. She is unable to give any history as such. She was initially taken to [facility] and then was transferred here. Was found to be hypotensive with blood pressure in the 90s, was hypothermic with a temperature of 86. Initial CT of the head was negative. She herself is unable to provide any history most of the history is from looking at the records and discussing with staff involved in her care. Her initial NIH was 20. She was placed on a bear hugger. Labs showed that she had a sodium of 158 BUN of 93 creatinine of 1.4 lactic acid of 3.1 CK was 643, K was 3.0 which was replaced. While being assessed in the ER it seems she aspirated and then was intubated  She is now on 4 pressors including Levophed, epinephrine, vasopressin and Landon-Synephrine. Maintaining a systolic pressure of 260. Continues to be acidotic and acidemic.   Labs this morning showed a sodium of 160, K 5.7, Cr 2.2, CO2 11, AG 25, calcium 9.1 magnesium 2.7, albumin 1.9, hemoglobin 18.2 white count 13.7 platelets 812  ABGs showed a pH of 6.9, PCO2 56 bicarbonate 11. Past History/Allergies? Social History:     Past Medical History:   Diagnosis Date    Acute cystitis 12/2021    Cancer [facility])     colon    Chronic respiratory acidosis 2018    Diabetes mellitus (HCC)     GERD (gastroesophageal reflux disease)     Hypercapnia 2018    Hyperlipidemia     Hypertension     Left leg cellulitis 12/2021    Obesity hypoventilation syndrome ([address] 2018       No Known Allergies    Social History     Socioeconomic History    Marital status:      Spouse name: Not on file    Number of children: Not on file    Years of education: Not on file    Highest education level: Not on file   Occupational History    Not on file   Tobacco Use    Smoking status: Never    Smokeless tobacco: Never   Substance and Sexual Activity    Alcohol use: No    Drug use: No    Sexual activity: Not on file   Other Topics Concern    Not on file   Social History Narrative    Not on file     Social Determinants of Health     Financial Resource Strain: Low Risk     Difficulty of Paying Living Expenses: Not hard at all   Food Insecurity: No Food Insecurity    Worried About Running Out of Food in the Last Year: Never true    Ran Out of Food in the Last Year: Never true   Transportation Needs: Not on file   Physical Activity: Not on file   Stress: Not on file   Social Connections: Not on file   Intimate Partner Violence: Not on file   Housing Stability: Not on file       Family History:        Family History   Problem Relation Age of Onset    Alzheimer's Disease Mother     Heart Failure Father     Lung Cancer Sister     Brain Cancer Brother        Outpatient Medications:     Medications Prior to Admission: aspirin 81 MG EC tablet, Take 1 tablet by mouth daily  Multiple Vitamins-Minerals (THERAPEUTIC MULTIVITAMIN-MINERALS) tablet, Take 1 tablet by mouth daily  melatonin 3 MG TABS tablet, Take 3 mg by mouth nightly as needed    Current Medications:     Scheduled Meds:    sodium chloride flush  5-40 mL IntraVENous 2 times per day    sennosides-docusate sodium  1 tablet Oral BID    vancomycin  1,250 mg IntraVENous Q24H    vancomycin (VANCOCIN) intermittent dosing (placeholder)   Other RX Placeholder    piperacillin-tazobactam  3,375 mg IntraVENous q8h    heparin (porcine)  5,000 Units SubCUTAneous 3 times per day    dextrose         Continuous Infusions:    sodium chloride 10 mL/hr at 22 1008    norepinephrine 100 mcg/min (22)    phenylephrine (JASON-SYNEPHRINE) 50mg/250mL infusion 300 mcg/min (22)    propofol Stopped (22)    pantoprozole (PROTONIX) infusion 8 mg/hr (22)    vasopressin (Septic Shock) infusion 0.04 Units/min (22)    dextrose      EPINEPHrine 5 mcg/min (22)    IV infusion builder       PRN Meds:  sodium chloride flush, sodium chloride, ondansetron **OR** ondansetron, polyethylene glycol, acetaminophen **OR** acetaminophen, potassium chloride **OR** potassium chloride, glucose, dextrose bolus **OR** dextrose bolus, glucagon (rDNA), dextrose    Review of Systems:     Review of systems not possible as patient is on vent   No fever chills  No cough phlegm hemoptysis  No diarrhea  Cardenas catheter in place urine output minimal    Input/Output:       I/O last 3 completed shifts:  In: -   Out: 10 [Urine:10]  Patient Vitals for the past 96 hrs (Last 3 readings):   Weight   227 172 lb 6.4 oz (78.2 kg)   22 195 lb (88.5 kg)      Vital Signs:   Temperature:  Temp: (!) 95.9 °F (35.5 °C)  TMax:   Temp (24hrs), Av.6 °F (33.7 °C), Min:89.2 °F (31.8 °C), Max:95.9 °F (35.5 °C)    Respirations:  Resp: 30  Pulse:   Heart Rate: (!) 117  BP:    BP: (!) 80/13  BP Range: Systolic (93VHG), BPW:98 , Min:77 , EUX:366       Diastolic (20JKU), FMD:41, Min:13, Max:87      Physical Examination:     General:  Sedated on ventilator.  FiO2 80% with a PEEP of 10 appears to be somewhat disheveled  HEENT:  Atraumatic, normocephalic, no throat congestion, moist mucosa. Eyes:   Pupils equal, round and reactive to light, pallor, no icterus. Neck:   No JVD, no thyromegaly, no lymphadenopathy. Chest:  Air entry fair bilaterally, decreased air entry bilaterally  Cardiac: S1 S2 RR no murmurs  Abdomen:  Soft, non-tender, no masses or organomegally appreciable, BS sluggish. :   No suprapubic or flank tenderness, Cardenas in place. Neuro:  Sedated on ventilator. Skin:   Pressure ulcers in the shoulder area plus other places, mottling noted  Extremities:  Mottled lower extremities bilaterally, 1+ edema bilaterally    Labs:       Recent Labs     09/29/22 2053 09/30/22 0439   WBC 12.6* 13.7*   RBC 5.27* 5.77*   HGB 17.2* 18.2*   HCT 49.2* 57.0*   MCV 93.4 98.8   MCH 32.6 31.5   MCHC 35.0* 31.9   RDW 13.8 14.3    Platelet clumps present, count appears adequate. MPV 11.0  --       BMP:   Recent Labs     09/30/22 0439 09/30/22  0537 09/30/22 0922   * 154* 160*   K 6.1* 6.2* 5.7*   * 122* 124*   CO2 18* 16* 11*   BUN 85* 85* 83*   CREATININE 1.83* 1.91* 2.15*   GLUCOSE 114* 96 103*   CALCIUM 10.9* 10.1 9.1      Phosphorus:   No results for input(s): PHOS in the last 72 hours.   Magnesium:    Recent Labs     09/30/22 0439 09/30/22 0922   MG 2.4 2.7*     Albumin:    Recent Labs     09/29/22 2053   LABALBU 1.9*     BNP:    No results found for: BNP  GHAZAL:    No results found for: GHAZAL  SPEP:  Lab Results   Component Value Date/Time    PROT 4.9 09/29/2022 08:53 PM     UPEP:   No results found for: LABPE  C3:   No results found for: C3  C4:   No results found for: C4  MPO ANCA:   No results found for: MPO  PR3 ANCA:   No results found for: PR3  Anti-GBM:   No results found for: GBMABIGG  Hep BsAg:       No results found for: HEPBSAG  Hep C AB:        No results found for: HEPCAB    Urinalysis/Chemistries:      Lab Results   Component Value Date/Time    NITRU NEGATIVE 09/29/2022 09:09 PM    COLORU Dark Yellow 09/29/2022 09:09 PM    PHUR 5.0 09/29/2022 09:09 PM    WBCUA 10 TO 20 09/29/2022 09:09 PM    RBCUA 2 TO 5 09/29/2022 09:09 PM    BACTERIA FEW 09/29/2022 09:09 PM    SPECGRAV 1.022 09/29/2022 09:09 PM    LEUKOCYTESUR SMALL 09/29/2022 09:09 PM    UROBILINOGEN Normal 09/29/2022 09:09 PM    BILIRUBINUR NEGATIVE  Verified by ictotest. 09/29/2022 09:09 PM    GLUCOSEU NEGATIVE 09/29/2022 09:09 PM    KETUA NEGATIVE 09/29/2022 09:09 PM     Urine Sodium:   No results found for: MARZENA  Urine Potassium:  No results found for: KUR  Urine Chloride:  No results found for: CLUR  Urine Osmolarity: No results found for: OSMOU  Urine Protein:   No results found for: TPU  Urine Creatinine:   No results found for: LABCREA  Urine Eosinophils:  No components found for: UEOS    Radiology:     CXR:     Assessment:     1. Acute Kidney Injury: Secondary to ischemic ATN from shock, low flow, systemic inflammatory response syndrome. Baseline creatinine not known creatinine on presentation 1.4 which is now up to 2.0 oligoanuric evolving acute kidney injury stage III  2. Suspected septic shock from systemic inflammatory spine syndrome, source of infection multiple including pressure ulcers, urinary tract, aspiration pneumonia. On vancomycin  3. Anion gap metabolic acidosis likely lactic acidosis from shock and low flow last lactic acid was five-point 3 repeat level pending  4. Respiratory acidosis secondary to aspiration pneumonia alveolar underventilation on the ventilator  5. Several pressure ulcers her shoulder area plus the back likely from poor self-cares  6. Hypovolemic hypernatremia from insensible losses, decreased intake, sodium is gone up to 160  7. Hyperkalemia likely from aggressive potassium replacement and acute kidney injury  8. Severe protein calorie malnutrition albumin 1.9    Plan:   1. Change fluids to D5W 150 [place] sodium bicarbonate at hemoglobin an hour  2.   Start free water replacement through the NG tube 300 mL Q4  3. Will Check Renal Ultrasound to r/o element of obstruction and to assess the kidney size/echotexture. 4. Comprehensive urine testing including Urinalysis, Urine sodium, potassium, chloride, Urine protein and creatinine to quantify the proteinuria  5. Will Order serum and urine protein electrophoresis to r/o element to occult paraprotein disease  6. Wean pressors as tolerated  7. Check lites every 6 hours, follow potassium closely and Rx as necessary  8. If no significant improvement in the next 24 hours will initiate renal placement therapy  9. Prognosis guarded  10. Check phosphorus level and replace as necessary  11. Consider alternatives to vancomycin if possible  12. Avoid nephrotoxic agents  13. Consider enteral or parenteral nutrition support  14. We will follow with you      Nutrition   Please ensure that patient is on a renal diet/TF. Avoid nephrotoxic drugs/contrast exposure. Thank you for the consultation. Please do not hesitate to contact us for any further questions/concerns. We will continue to follow along with you.

## 2022-09-30 NOTE — PROGRESS NOTES
[facility] Pharmacokinetic Monitoring Service - Vancomycin    Consulting Provider: Karla Camacho   Indication: pneumonia  Target Concentration: Dosing based on anticipated concentration <15 mg/L due to renal impairment/insufficiency  Day of Therapy: 2  Additional Antimicrobials: Zosyn    Pertinent Laboratory Values: Wt Readings from Last 1 Encounters:   09/30/22 172 lb 6.4 oz (78.2 kg)     Temp Readings from Last 1 Encounters:   09/30/22 (!) 96.3 °F (35.7 °C)     Estimated Creatinine Clearance: 23 mL/min (A) (based on SCr of 2.15 mg/dL (H)). Recent Labs     09/29/22 2053 09/30/22  0041 09/30/22  0439 09/30/22  0537 09/30/22  0922   CREATININE 1.41*   < > 1.83* 1.91* 2.15*   WBC 12.6*  --  13.7*  --   --     < > = values in this interval not displayed. Procalcitonin: N/A    MRSA Nasal Swab: was ordered by provider, awaiting results. Recent vancomycin administrations                     vancomycin (VANCOCIN) 1250 mg in sodium chloride 0.9% 250 mL IVPB (mg) 1,250 mg New Bag 09/29/22 2123                    Assessment:  Date/Time Current Dose Concentration Timing of Concentration (h) AUC   9/30/2022 1101 1250 once 28.8 13.55 N/A   Note: Serum concentrations collected for AUC dosing may appear elevated if collected in close proximity to the dose administered, this is not necessarily an indication of toxicity    Plan:  Current dosing regimen is  discontinued due to YOBANI. Will monitor vancomycin with random vancomycin levels to determine further doses.      Repeat vancomycin concentration ordered for TBD @ TBD   Pharmacy will continue to monitor patient and adjust therapy as indicated    Thank you for the consult,  Aris Anton, [facility]  9/30/2022 12:10 PM

## 2022-09-30 NOTE — PROGRESS NOTES
Patient is a 76year old female in room 3009     responded to [facility] page to locate family for patient. Patient has a niece Inna Antoine [phone] listed in her contacts. Per niece patient has a Daughter Pam Balderas with whom it is stated she is estranged. Family does not have a phone number for Bharat Powers. Inna Antoine (niece) is going to  [employer] in [place]. Also, Leander Michaels a son in law is attempting to reach Pam Balderas. Both parties have a phone number to  give to Bharat Powers to call the hospital.    The patient has a  Francia Mercado who is in the [address] [place] [address] in [place]. We are trying to establish if Mr. Aguirre has a caregiver. The patient's brother Chandrika Guzman [phone] stated if Bharat Powers relinquishes her rights, he IS NOT Willing to make medical decisions for his sister. He stated this to two chaplains and a physician.

## 2022-10-01 LAB
MRSA, DNA, NASAL: ABNORMAL
SPECIMEN DESCRIPTION: ABNORMAL
TOTAL CK: 376 U/L (ref 26–192)

## 2022-10-01 NOTE — CONSULTS
Clinical Ethics Consultation Note    History and Context:  75 yo female transferred from [facility] where she was taken by EMS after being found down at home for an unknown period of time. Her spouse lives in a nursing home. She had multiple bed sores, had feces and maggots on her. In the ED she vomited large amount coffee ground material. She was intubated for airway protection. Principal Problem:    Hypernatremia  Active Problems:    Encephalopathy    Acute respiratory failure with hypoxia and hypercapnia (HCC)    Septic shock (HCC)    Septicemia (HCC)  Resolved Problems:    * No resolved hospital problems. *    Age: 76 y.o. Gender: female  Gender Identity: female  Admitted Date: 9/29/2022  Consult Date: 09/30/22  MRN: [id number]  Valid Advanced Medical Directive: No    Process:  I spoke with spiritual care and reviewed the chart. Ethical Question(s) and Concern(s):  Legal surrogate    Analysis: There is a  who lives in an ECF, estranged child, maybe another, a brother who does not want to be involved, a niece and a granddaughter    Recommendations: Follow the state of [place] list of legal surrogates:  Spouse--determine if he has a guardian. If so he does not have capacty to be her legal surrogate decision maker. Adult children--the majority of them. They can all defer to one or they can all defer to the next layer  Siblings--majority of them. They can defer to one or all to the next layer  Aunts, uncles, nieces, nephews, grand children--any one who is willing and has capacity. Closing: Thank you. Please perfect serve with questions or comments.   Sherryle How, MD  N/A    Primary Ethical Theme: Primary Ethical Themes: Clarify legal surrogate decision maker  Secondary Ethical Theme:

## 2022-10-01 NOTE — ED PROVIDER NOTES
[address] [facility]     Emergency Department     Faculty Attestation    I performed a history and physical examination of the patient and discussed management with the resident. I reviewed the residents note and agree with the documented findings and plan of care. Any areas of disagreement are noted on the chart. I was personally present for the key portions of any procedures. I have documented in the chart those procedures where I was not present during the key portions. I have reviewed the emergency nurses triage note. I agree with the chief complaint, past medical history, past surgical history, allergies, medications, social and family history as documented unless otherwise noted below. For Physician Assistant/ Nurse Practitioner cases/documentation I have personally evaluated this patient and have completed at least one if not all key elements of the E/M (history, physical exam, and MDM). Additional findings are as noted. I have personally seen and evaluated the patient. I find the patient's history and physical exam are consistent with the NP/PA documentation. I agree with the care provided, treatment rendered, disposition and follow-up plan. 24-year-old female presenting as a transfer from [facility] [place]. Patient was found down in her home after a welfare check was called on her. She was found covered in filth and covered in maggots. She was noted to have large wounds to her right shoulder and hip. Labs are obtained and Zosyn was given prior to transport. Patient is awake but confused. She is unable to give any history. All history obtained from medical records from the outlying facility. [facility] ground transport started her on Levophed due to hypotension in route.     Exam:  General: Laying on the bed, alert, and pleasantly confused  CV: Tachycardic and regular rhythm  Lungs: Breathing comfortably on room air with no tachypnea, hypoxia, or increased work of breathing  Abdomen: Covered in excoriations, mildly tender to palpation  Extremities: Large eschar covering the right shoulder, possible to see bone in the wound bed. Large pressure sore on the right hip. Plan:  Septic work-up initiated including blood cultures, urine, urine cultures. Patient has made very little urine in Cardenas catheter that is in place. Patient also was noted to be hypothermic at the outlying facility, will start Lizzy hugger and continuous temperature monitoring. Neuro also consulted with right facial droop on exam.  With unknown last known well, not a lytic candidate, however will plan to get MRIs when patient is inpatient. Vancomycin added for broad-spectrum antibiotic coverage, has already received Zosyn. 2230: Patient is persistently hypotensive despite Levophed, will place central line. Stress dose steroids ordered. Patient is mildly agitated with the application of ultrasound gel, pushing away at resident. She is unable to be redirected. Small amount of Versed given to safely complete procedure. Patient was moderately sedated with the versed, nasal trumpet placed to maintain airway patency during the procedure. Patient returned to baseline, awake but confused. Approximately 2330, responded to monitor alarms, found patient with agonal respirations, with large volume coffee-ground emesis covering her face and neck. Immediately suctioned, with minimal cough and gag response. Called for help, RT and nursing staff at bedside. Patient bagged while preparing for intubation. Patient successfully intubated with etomidate and rocuronium. OG tube placed with large output of coffee-ground emesis. Protonix bolus ordered. Arterial line placed for closer blood pressure management. Care handed off to ICU team.    Critical Care:  This patient had a high probability of imminent or life-threatening deterioration due to sepsis, altered mental status,, which required my direct attention, intervention, and personal management. I have personally provided 35 minutes of critical care time exclusive of time spent on separately billable procedures. Time includes:   review of laboratory data, radiology results, discussion with consultants, and monitoring for potential decompensation. Interventions were performed as documented above.     Irma Solis MD   Attending Emergency Physician    (Please note that portions of this note were completed with a voice recognition program. Efforts were made to edit the dictations but occasionally words are mis-transcribed.)            Irma Solis MD  10/01/22 0040

## 2022-10-02 PROBLEM — L89.90 PRESSURE INJURY OF SKIN: Status: ACTIVE | Noted: 2022-10-02

## 2022-10-02 PROBLEM — T68.XXXA HYPOTHERMIA: Status: ACTIVE | Noted: 2022-10-02

## 2022-10-02 LAB
CULTURE: ABNORMAL
Lab: ABNORMAL
SPECIMEN DESCRIPTION: ABNORMAL

## 2022-10-03 NOTE — CARE COORDINATION
Notified Sanjana IBARRA that pt passed away 9/30. Provided them next of kin information for grandjasiel Welsh, [phone].

## 2022-10-04 LAB
ALBUMIN (CALCULATED): 1.1 G/DL (ref 3.2–5.2)
ALBUMIN PERCENT: 35 % (ref 45–65)
ALPHA 1 PERCENT: 6 % (ref 3–6)
ALPHA 2 PERCENT: 17 % (ref 6–13)
ALPHA-1-GLOBULIN: 0.2 G/DL (ref 0.1–0.4)
ALPHA-2-GLOBULIN: 0.5 G/DL (ref 0.5–0.9)
BETA GLOBULIN: 0.6 G/DL (ref 0.5–1.1)
BETA PERCENT: 20 % (ref 11–19)
GAMMA GLOBULIN %: 22 % (ref 9–20)
GAMMA GLOBULIN: 0.7 G/DL (ref 0.5–1.5)
PATHOLOGIST: ABNORMAL
PATHOLOGIST: NORMAL
PROTEIN ELECTROPHORESIS, SERUM: ABNORMAL
SERUM IFX INTERP: NORMAL
TOTAL PROT. SUM,%: 100 % (ref 98–102)
TOTAL PROT. SUM: 3.1 G/DL (ref 6.3–8.2)
TOTAL PROTEIN: 3.1 G/DL (ref 6.4–8.3)

## 2022-10-04 NOTE — PROGRESS NOTES
Physician Progress Note      PATIENT:               Samaria Kingsley  CSN #:                  [id number]  :                       1945  ADMIT DATE:       2022 7:15 PM  [address] [place] DATE:        2022 6:40 PM  RESPONDING  PROVIDER #:        Kiki Cedillo MD          QUERY TEXT:    Pt admitted with Sepsis and pneumonia. Pt noted to have episode Hematemesis   then intubated for airweay protection. If possible, please document in the   progress notes and discharge summary if you are evaluating and/or treating any   of the following:    Note: CAP and HCAP indicate where the pneumonia was acquired, not a specific   type. The medical record reflects the following:  Risk Factors: Age, unknown length of down time, Multiple wounds, AMS  Clinical Indicators: H/P Patient was increasingly altered and then had a large   volume episode of coffee ground emesis. Decision was made to intubate for   airway protection. CT Head Neg. Concern for pneumonia  Intubated and sedated   PRVC 30/390/12/100% ABG 7.04/65.7/143/17.8/14. CXR Bilateral perihilar and   right basilar infiltrates with a small to moderate right effusion, suspicious   for multilobar pneumonia. Treatment: IV Vanco/Zosyn, Ventilator support, Labs ICU monitoring, CXR, XR   Abd, Levophed, Consults Neuro/Stroke/Gen Surg/  Options provided:  -- Aspiration pneumonia  -- Gram negative pneumonia  -- Gram positive pneumonia  -- MRSA pneumonia  -- MSSA pneumonia  -- Viral pneumonia  -- Other - I will add my own diagnosis  -- Disagree - Not applicable / Not valid  -- Disagree - Clinically unable to determine / Unknown  -- Refer to Clinical Documentation Reviewer    PROVIDER RESPONSE TEXT:    This patient has aspiration pneumonia. Query created by:  Agustina Page on 2022 9:09 AM      Electronically signed by:  Kiki Cedillo MD 10/3/2022 9:06 PM

## 2022-10-05 NOTE — DISCHARGE SUMMARY
[address]     Department of Internal Medicine - Staff Internal Medicine Teaching Service    INPATIENT DISCHARGE SUMMARY    Patient Identification:  Shary Osgood is a 76 y.o. female. :  1945  MRN: [id number]     Acct: [de-identified]   PCP: Kei Key DO  Admit Date:  2022  Discharge date and time: 2022  6:40 PM   Attending Provider: No att. providers found                                     [address] Problem Lists:  Principal Problem:    Hypernatremia  Active Problems:    Encephalopathy    Acute respiratory failure with hypoxia and hypercapnia (HCC)    Septic shock ([address]    Septicemia ([facility] [address]    Hypothermia    Pressure injury of skin  Resolved Problems:    * No resolved hospital problems. *    HOSPITAL STAY     Brief Inpatient course:   Shary Osgood is a 76 y.o. female who was admitted for the management of Hypernatremia, presented via EMS from home found down, confused, and covered in feces and maggots for what was believed to be the past couple of days. Additionally she was found to have a large deep pressure sore on her right shoulder and other pressure sores on the right side of her body. She was initially taken to [facility] but was transferred to [facility] Intensive Care Unit for further management. Patient was intubated in the ED as there was concern for aspiration and was found to be hypotensive. She was started on pressors for blood pressure support but continued to deteriorate requiring 4 pressors by evening. Her significant other was contacted in an [facility] he was staying in and her code status was changed to [facility]. She unfortunately  that night at 4:51 pm on 2022.      Procedures/ Significant Interventions:    Endotracheal Intubation  Mechanical Ventilation     Consults:     Consults:     Final Specialist Recommendations/Findings:   IP CONSULT TO NEUROLOGY  IP CONSULT TO GENERAL SURGERY  IP CONSULT TO CRITICAL CARE  PHARMACY TO DOSE VANCOMYCIN  IP CONSULT TO NEPHROLOGY  IP CONSULT TO ETHICS  IP CONSULT TO PALLIATIVE CARE      Any Hospital Acquired Infections: none    Discharge Functional Status:  stable    DISCHARGE PLAN     Disposition:   Patient unfortunately passed away at 4:51 pm on 9/30/2022. Note that over 30 minutes was spent in preparing discharge papers, discussing discharge with patient, medication review, etc.    Sudha Kim MD  Internal Medicine Resident, PGY-3  [facility];  [place], [place]  10/5/2022, 7:37 PM